# Patient Record
Sex: FEMALE | Race: WHITE | NOT HISPANIC OR LATINO | Employment: OTHER | URBAN - METROPOLITAN AREA
[De-identification: names, ages, dates, MRNs, and addresses within clinical notes are randomized per-mention and may not be internally consistent; named-entity substitution may affect disease eponyms.]

---

## 2021-01-14 ENCOUNTER — TRANSCRIBE ORDERS (OUTPATIENT)
Dept: ADMINISTRATIVE | Facility: HOSPITAL | Age: 81
End: 2021-01-14

## 2021-01-14 DIAGNOSIS — Z87.19 HISTORY OF CIRRHOSIS: Primary | ICD-10-CM

## 2021-01-25 ENCOUNTER — HOSPITAL ENCOUNTER (OUTPATIENT)
Dept: ULTRASOUND IMAGING | Facility: HOSPITAL | Age: 81
Discharge: HOME | End: 2021-01-25
Admitting: INTERNAL MEDICINE

## 2021-01-25 DIAGNOSIS — Z87.19 HISTORY OF CIRRHOSIS: ICD-10-CM

## 2021-01-25 PROCEDURE — 76700 US EXAM ABDOM COMPLETE: CPT

## 2021-01-29 ENCOUNTER — HOSPITAL ENCOUNTER (OUTPATIENT)
Dept: CARDIOLOGY | Facility: HOSPITAL | Age: 81
Discharge: HOME | End: 2021-01-29

## 2021-01-29 DIAGNOSIS — Z00.6 ENCOUNTER FOR EXAMINATION FOR NORMAL COMPARISON OR CONTROL IN CLINICAL RESEARCH PROGRAM: ICD-10-CM

## 2021-02-18 DIAGNOSIS — Z01.812 BLOOD TESTS PRIOR TO TREATMENT OR PROCEDURE: Primary | ICD-10-CM

## 2021-02-19 ENCOUNTER — LAB (OUTPATIENT)
Dept: PULMONOLOGY | Facility: CLINIC | Age: 81
End: 2021-02-19

## 2021-02-19 DIAGNOSIS — Z01.812 BLOOD TESTS PRIOR TO TREATMENT OR PROCEDURE: ICD-10-CM

## 2021-02-19 PROCEDURE — U0004 COV-19 TEST NON-CDC HGH THRU: HCPCS | Performed by: INTERNAL MEDICINE

## 2021-02-19 PROCEDURE — 99211 OFF/OP EST MAY X REQ PHY/QHP: CPT | Performed by: INTERNAL MEDICINE

## 2021-02-19 PROCEDURE — U0005 INFEC AGEN DETEC AMPLI PROBE: HCPCS | Performed by: INTERNAL MEDICINE

## 2021-02-20 LAB — SARS-COV-2 RNA RESP QL NAA+PROBE: NOT DETECTED

## 2021-02-22 ENCOUNTER — OFFICE VISIT (OUTPATIENT)
Dept: PULMONOLOGY | Facility: CLINIC | Age: 81
End: 2021-02-22

## 2021-02-22 VITALS
HEIGHT: 62 IN | WEIGHT: 166.5 LBS | DIASTOLIC BLOOD PRESSURE: 70 MMHG | HEART RATE: 86 BPM | RESPIRATION RATE: 18 BRPM | TEMPERATURE: 96.9 F | BODY MASS INDEX: 30.64 KG/M2 | SYSTOLIC BLOOD PRESSURE: 106 MMHG | OXYGEN SATURATION: 93 %

## 2021-02-22 DIAGNOSIS — J30.89 NON-SEASONAL ALLERGIC RHINITIS, UNSPECIFIED TRIGGER: ICD-10-CM

## 2021-02-22 DIAGNOSIS — J43.2 CENTRILOBULAR EMPHYSEMA (HCC): Primary | ICD-10-CM

## 2021-02-22 DIAGNOSIS — R91.1 LUNG NODULE: ICD-10-CM

## 2021-02-22 PROBLEM — E78.5 HYPERLIPIDEMIA LDL GOAL <100: Status: ACTIVE | Noted: 2021-02-22

## 2021-02-22 PROBLEM — G47.33 OBSTRUCTIVE SLEEP APNEA: Status: ACTIVE | Noted: 2021-02-22

## 2021-02-22 PROBLEM — I42.8 NONISCHEMIC CARDIOMYOPATHY (HCC): Status: ACTIVE | Noted: 2021-02-22

## 2021-02-22 PROBLEM — I42.0 ISCHEMIC DILATED CARDIOMYOPATHY (HCC): Status: ACTIVE | Noted: 2021-02-22

## 2021-02-22 PROBLEM — I10 ESSENTIAL HYPERTENSION: Status: ACTIVE | Noted: 2021-02-22

## 2021-02-22 PROBLEM — I25.5 ISCHEMIC DILATED CARDIOMYOPATHY (HCC): Status: ACTIVE | Noted: 2021-02-22

## 2021-02-22 PROCEDURE — 94726 PLETHYSMOGRAPHY LUNG VOLUMES: CPT | Performed by: INTERNAL MEDICINE

## 2021-02-22 PROCEDURE — 99204 OFFICE O/P NEW MOD 45 MIN: CPT | Performed by: INTERNAL MEDICINE

## 2021-02-22 PROCEDURE — 94060 EVALUATION OF WHEEZING: CPT | Performed by: INTERNAL MEDICINE

## 2021-02-22 PROCEDURE — 94729 DIFFUSING CAPACITY: CPT | Performed by: INTERNAL MEDICINE

## 2021-02-22 RX ORDER — CITALOPRAM 20 MG/1
20 TABLET ORAL DAILY
COMMUNITY
Start: 2021-01-18

## 2021-02-22 RX ORDER — CHOLECALCIFEROL (VITAMIN D3) 125 MCG
5 CAPSULE ORAL NIGHTLY PRN
COMMUNITY

## 2021-02-22 RX ORDER — ALBUTEROL SULFATE 90 UG/1
2 AEROSOL, METERED RESPIRATORY (INHALATION) EVERY 4 HOURS PRN
COMMUNITY

## 2021-02-22 RX ORDER — FUROSEMIDE 40 MG/1
40 TABLET ORAL DAILY
COMMUNITY
Start: 2021-01-16

## 2021-02-22 RX ORDER — ALBUTEROL SULFATE 90 UG/1
4 AEROSOL, METERED RESPIRATORY (INHALATION) ONCE
Status: COMPLETED | OUTPATIENT
Start: 2021-02-22 | End: 2021-02-22

## 2021-02-22 RX ORDER — GABAPENTIN 600 MG/1
TABLET ORAL
COMMUNITY
Start: 2020-11-25

## 2021-02-22 RX ORDER — CARVEDILOL 6.25 MG/1
6.25 TABLET ORAL 2 TIMES DAILY WITH MEALS
COMMUNITY
Start: 2021-02-16 | End: 2021-02-24

## 2021-02-22 RX ORDER — AMITRIPTYLINE HYDROCHLORIDE 25 MG/1
25 TABLET, FILM COATED ORAL NIGHTLY
COMMUNITY
Start: 2021-01-30

## 2021-02-22 RX ORDER — ACETAMINOPHEN 160 MG
2000 TABLET,DISINTEGRATING ORAL DAILY
COMMUNITY

## 2021-02-22 RX ORDER — DONEPEZIL HYDROCHLORIDE 10 MG/1
10 TABLET, FILM COATED ORAL
COMMUNITY
Start: 2020-12-14

## 2021-02-22 RX ORDER — OMEPRAZOLE 40 MG/1
40 CAPSULE, DELAYED RELEASE ORAL DAILY
COMMUNITY
Start: 2021-01-16

## 2021-02-22 RX ADMIN — ALBUTEROL SULFATE 4 PUFF: 90 AEROSOL, METERED RESPIRATORY (INHALATION) at 13:00
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
## 2021-02-24 ENCOUNTER — CONSULT (OUTPATIENT)
Dept: CARDIOLOGY | Facility: CLINIC | Age: 81
End: 2021-02-24

## 2021-02-24 VITALS
OXYGEN SATURATION: 95 % | HEIGHT: 62 IN | DIASTOLIC BLOOD PRESSURE: 62 MMHG | BODY MASS INDEX: 30.29 KG/M2 | SYSTOLIC BLOOD PRESSURE: 130 MMHG | WEIGHT: 164.6 LBS | HEART RATE: 95 BPM

## 2021-02-24 DIAGNOSIS — I10 ESSENTIAL HYPERTENSION: ICD-10-CM

## 2021-02-24 DIAGNOSIS — I42.8 NONISCHEMIC CARDIOMYOPATHY (HCC): Primary | ICD-10-CM

## 2021-02-24 DIAGNOSIS — E78.5 HYPERLIPIDEMIA LDL GOAL <100: ICD-10-CM

## 2021-02-24 DIAGNOSIS — I49.3 PVC (PREMATURE VENTRICULAR CONTRACTION): ICD-10-CM

## 2021-02-24 PROCEDURE — 93284 PRGRMG EVAL IMPLANTABLE DFB: CPT | Performed by: INTERNAL MEDICINE

## 2021-02-24 PROCEDURE — 99204 OFFICE O/P NEW MOD 45 MIN: CPT | Performed by: INTERNAL MEDICINE

## 2021-02-24 RX ORDER — CARVEDILOL 12.5 MG/1
12.5 TABLET ORAL 2 TIMES DAILY
Qty: 180 TABLET | Refills: 3 | Status: SHIPPED | OUTPATIENT
Start: 2021-02-24

## 2021-02-24 RX ORDER — EZETIMIBE 10 MG/1
10 TABLET ORAL DAILY
Qty: 30 TABLET | Refills: 11 | Status: SHIPPED | OUTPATIENT
Start: 2021-02-24
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
## 2021-03-11 ENCOUNTER — TRANSCRIBE ORDERS (OUTPATIENT)
Dept: ADMINISTRATIVE | Facility: HOSPITAL | Age: 81
End: 2021-03-11

## 2021-03-11 ENCOUNTER — HOSPITAL ENCOUNTER (OUTPATIENT)
Dept: GENERAL RADIOLOGY | Facility: HOSPITAL | Age: 81
Discharge: HOME | End: 2021-03-11
Admitting: INTERNAL MEDICINE

## 2021-03-11 DIAGNOSIS — R60.9 EDEMA, UNSPECIFIED TYPE: ICD-10-CM

## 2021-03-11 DIAGNOSIS — R60.9 EDEMA, UNSPECIFIED TYPE: Primary | ICD-10-CM

## 2021-03-11 PROCEDURE — 71046 X-RAY EXAM CHEST 2 VIEWS: CPT

## 2021-04-07 ENCOUNTER — DOCUMENTATION (OUTPATIENT)
Dept: PULMONOLOGY | Facility: CLINIC | Age: 81
End: 2021-04-07

## 2022-06-10 ENCOUNTER — TELEPHONE (OUTPATIENT)
Dept: HEMATOLOGY/ONCOLOGY | Facility: CLINIC | Age: 82
End: 2022-06-10
Payer: MEDICARE

## 2022-06-13 ENCOUNTER — TELEPHONE (OUTPATIENT)
Dept: HEMATOLOGY/ONCOLOGY | Facility: CLINIC | Age: 82
End: 2022-06-13

## 2022-06-13 ENCOUNTER — OFFICE VISIT (OUTPATIENT)
Dept: HEMATOLOGY/ONCOLOGY | Facility: CLINIC | Age: 82
End: 2022-06-13
Payer: MEDICARE

## 2022-06-13 VITALS
HEIGHT: 62 IN | DIASTOLIC BLOOD PRESSURE: 75 MMHG | SYSTOLIC BLOOD PRESSURE: 129 MMHG | RESPIRATION RATE: 16 BRPM | BODY MASS INDEX: 27.23 KG/M2 | HEART RATE: 75 BPM | OXYGEN SATURATION: 92 % | WEIGHT: 148 LBS

## 2022-06-13 DIAGNOSIS — Z79.620 LONG-TERM CURRENT USE OF RITUXIMAB: ICD-10-CM

## 2022-06-13 DIAGNOSIS — R94.5 ABNORMAL RESULTS OF LIVER FUNCTION STUDIES: ICD-10-CM

## 2022-06-13 DIAGNOSIS — C88.4 EXTRANODAL MARGINAL ZONE B-CELL LYMPHOMA: Primary | ICD-10-CM

## 2022-06-13 PROBLEM — C88.40 EXTRANODAL MARGINAL ZONE B-CELL LYMPHOMA: Status: ACTIVE | Noted: 2022-06-13

## 2022-06-13 LAB
ALBUMIN SERPL BCP-MCNC: 3.4 G/DL (ref 3.4–5)
ALBUMIN/GLOBULIN RATIO: 0.76 RATIO (ref 1.1–1.8)
ALP SERPL-CCNC: 70 U/L (ref 46–116)
ALT SERPL W P-5'-P-CCNC: 38 U/L (ref 12–78)
ANION GAP SERPL CALC-SCNC: 5 MMOL/L (ref 3–11)
AST SERPL-CCNC: 35 U/L (ref 15–37)
BASOPHILS NFR BLD: 0.8 % (ref 0–3)
BILIRUB SERPL-MCNC: 0.3 MG/DL (ref 0–1)
BUN SERPL-MCNC: 31 MG/DL (ref 7–18)
BUN/CREAT SERPL: 23.13 RATIO (ref 7–18)
CALCIUM SERPL-MCNC: 9.2 MG/DL (ref 8.8–10.5)
CHLORIDE SERPL-SCNC: 106 MMOL/L (ref 100–108)
CO2 SERPL-SCNC: 30 MMOL/L (ref 21–32)
CREAT SERPL-MCNC: 1.34 MG/DL (ref 0.55–1.02)
EOSINOPHIL NFR BLD: 4.4 % (ref 1–3)
ERYTHROCYTE [DISTWIDTH] IN BLOOD BY AUTOMATED COUNT: 14.6 % (ref 12.5–18)
GFR ESTIMATION: 38
GLOBULIN: 4.5 G/DL (ref 2.3–3.5)
GLUCOSE SERPL-MCNC: 88 MG/DL (ref 70–110)
HAV IGM SERPL QL IA: NONREACTIVE
HBV CORE IGM SERPL QL IA: NONREACTIVE
HBV SURFACE AB SER-ACNC: NONREACTIVE M[IU]/ML
HBV SURFACE AG SERPL QL IA: NONREACTIVE
HCT VFR BLD AUTO: 36.7 % (ref 37–47)
HCV AB SERPL QL IA: NONREACTIVE
HGB BLD-MCNC: 11.9 G/DL (ref 12–16)
LDH SERPL L TO P-CCNC: 197 U/L (ref 82–234)
LYMPHOCYTES NFR BLD: 31 % (ref 25–40)
MACROCYTES BLD QL SMEAR: NORMAL
MCH RBC QN AUTO: 33.1 PG (ref 27–31.2)
MCHC RBC AUTO-ENTMCNC: 32.4 G/DL (ref 31.8–35.4)
MCV RBC AUTO: 101.9 FL (ref 80–97)
MONOCYTES NFR BLD: 8.3 % (ref 1–15)
NEUTROPHILS # BLD AUTO: 4.97 10*3/UL (ref 1.8–7.7)
NEUTROPHILS NFR BLD: 54.8 % (ref 37–80)
NUCLEATED RED BLOOD CELLS: 0 %
PLATELETS: 131 10*3/UL (ref 142–424)
POTASSIUM SERPL-SCNC: 3.6 MMOL/L (ref 3.6–5.2)
PROT SERPL-MCNC: 7.9 G/DL (ref 6.4–8.2)
RBC # BLD AUTO: 3.6 10*6/UL (ref 4.2–5.4)
SODIUM BLD-SCNC: 141 MMOL/L (ref 135–145)
WBC # BLD: 9.1 10*3/UL (ref 4.6–10.2)

## 2022-06-13 PROCEDURE — 99205 PR OFFICE/OUTPT VISIT, NEW, LEVL V, 60-74 MIN: ICD-10-PCS | Mod: S$GLB,,, | Performed by: INTERNAL MEDICINE

## 2022-06-13 PROCEDURE — 99205 OFFICE O/P NEW HI 60 MIN: CPT | Mod: S$GLB,,, | Performed by: INTERNAL MEDICINE

## 2022-06-13 RX ORDER — FUROSEMIDE 20 MG/1
TABLET ORAL
COMMUNITY
Start: 2022-04-11

## 2022-06-13 RX ORDER — PREDNISONE 10 MG/1
TABLET ORAL
COMMUNITY
Start: 2022-06-09

## 2022-06-13 RX ORDER — CHOLECALCIFEROL (VITAMIN D3) 25 MCG
1000 TABLET ORAL DAILY
COMMUNITY

## 2022-06-13 RX ORDER — IPRATROPIUM BROMIDE 42 UG/1
2 SPRAY, METERED NASAL 4 TIMES DAILY
COMMUNITY

## 2022-06-13 RX ORDER — ALLOPURINOL 100 MG/1
TABLET ORAL
COMMUNITY
Start: 2022-05-21

## 2022-06-13 RX ORDER — CITALOPRAM 20 MG/1
TABLET, FILM COATED ORAL
COMMUNITY
Start: 2022-01-25

## 2022-06-13 RX ORDER — METOPROLOL SUCCINATE 50 MG/1
TABLET, EXTENDED RELEASE ORAL
COMMUNITY
Start: 2022-03-25

## 2022-06-13 RX ORDER — MULTIVIT WITH MINERALS/HERBS
1 TABLET ORAL DAILY
COMMUNITY

## 2022-06-13 RX ORDER — ROSUVASTATIN CALCIUM 5 MG/1
TABLET, COATED ORAL
COMMUNITY
Start: 2022-03-25

## 2022-06-13 RX ORDER — LEVOFLOXACIN 500 MG/1
TABLET, FILM COATED ORAL
COMMUNITY
Start: 2022-06-09

## 2022-06-13 RX ORDER — ACETAMINOPHEN 500 MG
TABLET ORAL NIGHTLY
COMMUNITY

## 2022-06-13 RX ORDER — GABAPENTIN 600 MG/1
TABLET ORAL
COMMUNITY
Start: 2022-05-23

## 2022-06-13 RX ORDER — FUROSEMIDE 40 MG/1
TABLET ORAL
COMMUNITY
Start: 2022-05-23

## 2022-06-13 RX ORDER — FLUTICASONE FUROATE, UMECLIDINIUM BROMIDE AND VILANTEROL TRIFENATATE 200; 62.5; 25 UG/1; UG/1; UG/1
1 POWDER RESPIRATORY (INHALATION) DAILY
COMMUNITY

## 2022-06-13 RX ORDER — MIRTAZAPINE 15 MG/1
TABLET, FILM COATED ORAL
COMMUNITY
Start: 2022-05-04

## 2022-06-13 RX ORDER — DONEPEZIL HYDROCHLORIDE 10 MG/1
TABLET, FILM COATED ORAL
COMMUNITY
Start: 2022-03-10

## 2022-06-13 NOTE — LETTER
June 13, 2022        Oscar Jamison MD  [facility]  Rigo SMITH [id number]             [facility]  [address]  [place] [address]  Phone: [phone]  Fax: [phone]   Patient: DEMOND Solorzano   MR Number: [id number]   YOB: 1940   Date of Visit: 6/13/2022       Dear Dr. Jamison:    Thank you for referring A Jeanine to me for evaluation. Attached you will find relevant portions of my assessment and plan of care.    If you have questions, please do not hesitate to call me. I look forward to following A Jeanine along with you.    Sincerely,      Leighton Ring MD            CC  No Recipients    Enclosure

## 2022-06-13 NOTE — PROGRESS NOTES
MEDICAL ONCOLOGY INITIAL CONSULTATION NOTE    Patient ID: DEMOND Solorzano is a 82 y.o. female.    Chief Complaint: B cell Lymphoma    HPI:  Patient is 82-year-old female with past medical history of COPD, emphysema, bronchitis, depression, gastroesophageal reflux disease, hypertension, congestive heart failure with ejection fraction normalized after biventricular pacing, atelectasis and scarring, degenerative joint disease, hyperlipidemia, sleep apnea, dilated cardiomyopathy, ventricular tachycardia status post ICD, coronary artery disease, chronic back pain, radiculopathy who was referred by Pulmonary after imaging revealed a 1.9 cm left lower lobe nodular density by CT/PET in March of 2019 with SUV 2. Patient underwent repeat PET-CT and left lower lobe CT-guided needle biopsy secondary to enlarging left lower lobe lesion.  Findings were consistent with B-cell lymphoma.  Please see detailed pathology report below.  Patient presents to our clinic today for further evaluation.      Pathology: 06/08/22    Lung nodule, left lower lobe, biopsy:  Extranodal marginal zone B-cell lymphoma (see comment).    Comment:  Sections of the small biopsy show a population of small to medium, round atypical lymphocytes with coarse chromatin and occasional nucleoli, demonstrating moderate cytoplasm and monocyte toyed morphology.  Immunohistochemical stains are performed on block 1A with appropriate controls (in addition to being repeated with flow cytometry) in order to further evaluate the atypical lymphocytes.  The cells of interest demonstrated positive staining for CD 20, [place] 5 and BCL 2 while showing negative straining for CD3 and CD5 (highlights T-cells), BCL6 and CD10 (highlights rare diminutive germinal centers) and cyclin D1.  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    Interpretation:  Taken together these results  demonstrate the presence of clonal population of B lymphocytes.  The immunophenotype in this case in our corrected stick of chronic lymphocytic leukemia/small lymphocytic lymphoma or mantle cell lymphoma.        Imaging:     PET/CT scan: 6/1/22    Chest:   1. There is an area of ground-glass infiltration seen in the left posterior lung.  This measures maximum of approximately 14 mm today versus approximately 8 mm on the previous examination.  The SUV in this area is approximately 1.5.  Given the growth of this lesion this would be suspicious for a possible low-grade malignancy.  2. An ill-defined area of ground-glass infiltrate is seen in the right mid lung field measuring approximately 1.3 cm.  This was not present on the previous examination.  This has an SUV of 1.7  3. A small area of ground-glass infiltrate is seen in the right lower lobe region measuring about 1 cm in diameter this has an SUV in the area of approximately 1.1  4. An area of spiculated parenchymal densities seen in the left lower lobe having a maximum diameter of approximately 2 cm.  This had a diameter of about 1.9 cm on previous examination.  The SUV in this is approximately 3.    Impression:  Multiple areas of parenchymal density in the lungs.  These are either slightly enlarged are new when compared to previous examinations.  Uptake values would be consistent with inflammatory, infectious or low-grade malignancy.      Past Medical History:   Diagnosis Date    Arthritis     COPD (chronic obstructive pulmonary disease)     Hypertension      Family History   Problem Relation Age of Onset    Lung cancer Father     Brain cancer Brother     Breast cancer Paternal Grandmother     Cancer Son      Social History     Socioeconomic History    Marital status:    Tobacco Use    Smoking status: Former Smoker     Years: 15.00    Smokeless tobacco: Never Used   Substance and Sexual Activity    Alcohol use: Not Currently     Past Surgical  History:   Procedure Laterality Date    arm surgery      BACK SURGERY      CHOLECYSTECTOMY      TUBAL LIGATION           Review of systems:  Review of Systems   Constitutional: Positive for activity change. Negative for appetite change, chills, diaphoresis, fatigue and unexpected weight change.   HENT: Negative for congestion, facial swelling, hearing loss, mouth sores, trouble swallowing and voice change.    Eyes: Negative for photophobia, pain, discharge and itching.   Respiratory: Positive for cough and shortness of breath. Negative for apnea, choking and chest tightness.    Cardiovascular: Negative for chest pain, palpitations and leg swelling.   Gastrointestinal: Negative for abdominal distention, abdominal pain, anal bleeding and blood in stool.   Endocrine: Negative for cold intolerance, heat intolerance, polydipsia and polyphagia.   Genitourinary: Negative for difficulty urinating, dysuria, flank pain and hematuria.   Musculoskeletal: Negative for arthralgias, back pain, joint swelling, myalgias, neck pain and neck stiffness.   Skin: Negative for color change, pallor and wound.   Allergic/Immunologic: Positive for environmental allergies. Negative for food allergies and immunocompromised state.   Neurological: Negative for dizziness, seizures, facial asymmetry, speech difficulty, light-headedness, numbness and headaches.   Hematological: Negative for adenopathy. Does not bruise/bleed easily.   Psychiatric/Behavioral: Negative for agitation, behavioral problems, confusion, decreased concentration and sleep disturbance.         Physical Exam  Vitals and nursing note reviewed.   Constitutional:       General: She is not in acute distress.     Appearance: Normal appearance. She is not ill-appearing.   HENT:      Head: Normocephalic and atraumatic.      Nose: No congestion or rhinorrhea.   Eyes:      General: No scleral icterus.     Extraocular Movements: Extraocular movements intact.      Pupils: Pupils are  equal, round, and reactive to light.   Cardiovascular:      Rate and Rhythm: Normal rate and regular rhythm.      Pulses: Normal pulses.      Heart sounds: Normal heart sounds. No murmur heard.    No gallop.   Pulmonary:      Effort: Pulmonary effort is normal. No respiratory distress.      Breath sounds: Normal breath sounds. No stridor. No wheezing or rhonchi.   Abdominal:      General: Bowel sounds are normal. There is no distension.      Palpations: There is no mass.      Tenderness: There is no abdominal tenderness. There is no guarding.   Musculoskeletal:         General: No swelling, tenderness, deformity or signs of injury. Normal range of motion.      Cervical back: Normal range of motion and neck supple. No rigidity. No muscular tenderness.      Right lower leg: No edema.      Left lower leg: No edema.   Skin:     General: Skin is warm.      Coloration: Skin is not jaundiced or pale.      Findings: No bruising or lesion.   Neurological:      General: No focal deficit present.      Mental Status: She is alert and oriented to person, place, and time.      Cranial Nerves: No cranial nerve deficit.      Sensory: No sensory deficit.      Motor: No weakness.      Gait: Gait normal.   Psychiatric:         Mood and Affect: Mood normal.         Behavior: Behavior normal.         Thought Content: Thought content normal.       Vitals:    06/13/22 1056   BP: 129/75   Pulse: 75   Resp: 16   Body surface area is 1.71 meters squared.    Assessment/Plan:      ECOG 2    1. 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     == PET scan does not show any findings beyond the diaphragm and I believe with a lesions noted on both sides of the lung we are likely looking at early stage extranodal marginal zone B-cell lymphoma arising from the lungs.   == ISRT is preferred option per  NCCN guidelines.  Either ways, looking at her FEV1 and age do not feel she is a surgical candidate but will discuss in tumor board.  Will hence place referral to Radiation Oncology.  If not a candidate for radiation, then rituximab versus observation can be considered.   I discussed with her that I feel she should be able to tolerate rituximab in this case as it is not traditional chemotherapy and is more like a targeted approach to CD 20 positive lymphoma cells         Plan:  Referral to Radiation Oncology   Tumor Board discussion  Relevant Labs as above       Total time spent in counseling and discussion about further management options including relevant lab work, treatment,  prognosis, medications and intended side effects was more than 60 minutes. More than 50% of the time was spent on counseling and coordination of care.  I spent a total of 60 minutes on the day of the visit.This includes face to face time and non-face to face time preparing to see the patient (eg, review of tests), Obtaining and/or reviewing separately obtained history, Documenting clinical information in the electronic or other health record, Independently interpreting resultsand communicating results to the patient/family/caregiver, or Care coordination.

## 2022-06-20 ENCOUNTER — OFFICE VISIT (OUTPATIENT)
Dept: HEMATOLOGY/ONCOLOGY | Facility: CLINIC | Age: 82
End: 2022-06-20
Payer: MEDICARE

## 2022-06-20 VITALS
SYSTOLIC BLOOD PRESSURE: 147 MMHG | DIASTOLIC BLOOD PRESSURE: 55 MMHG | BODY MASS INDEX: 27.23 KG/M2 | OXYGEN SATURATION: 89 % | RESPIRATION RATE: 18 BRPM | HEIGHT: 62 IN | WEIGHT: 148 LBS | HEART RATE: 53 BPM

## 2022-06-20 DIAGNOSIS — C88.4 EXTRANODAL MARGINAL ZONE B-CELL LYMPHOMA: Primary | ICD-10-CM

## 2022-06-20 PROCEDURE — 99215 OFFICE O/P EST HI 40 MIN: CPT | Mod: S$GLB,,, | Performed by: INTERNAL MEDICINE

## 2022-06-20 PROCEDURE — 99215 PR OFFICE/OUTPT VISIT, EST, LEVL V, 40-54 MIN: ICD-10-PCS | Mod: S$GLB,,, | Performed by: INTERNAL MEDICINE

## 2022-06-20 NOTE — PROGRESS NOTES
MEDICAL ONCOLOGY FOLLOW UP CONSULTATION NOTE    Patient ID: DEMOND Solorzano is a 82 y.o. female.    Chief Complaint: B cell Lymphoma    HPI:  Patient is 82-year-old female with past medical history of COPD, emphysema, bronchitis, depression, gastroesophageal reflux disease, hypertension, congestive heart failure with ejection fraction normalized after biventricular pacing, atelectasis and scarring, degenerative joint disease, hyperlipidemia, sleep apnea, dilated cardiomyopathy, ventricular tachycardia status post ICD, coronary artery disease, chronic back pain, radiculopathy who was referred by Pulmonary after imaging revealed a 1.9 cm left lower lobe nodular density by CT/PET in March of 2019 with SUV 2. Patient underwent repeat PET-CT and left lower lobe CT-guided needle biopsy secondary to enlarging left lower lobe lesion.  Findings were consistent with B-cell lymphoma.  Please see detailed pathology report below.  Patient presents to our clinic today for further evaluation.      Pathology: 06/08/22    Lung nodule, left lower lobe, biopsy:  Extranodal marginal zone B-cell lymphoma (see comment).    Comment:  Sections of the small biopsy show a population of small to medium, round atypical lymphocytes with coarse chromatin and occasional nucleoli, demonstrating moderate cytoplasm and monocyte toyed morphology.  Immunohistochemical stains are performed on block 1A with appropriate controls (in addition to being repeated with flow cytometry) in order to further evaluate the atypical lymphocytes.  The cells of interest demonstrated positive staining for CD 20, [place] 5 and BCL 2 while showing negative straining for CD3 and CD5 (highlights T-cells), BCL6 and CD10 (highlights rare diminutive germinal centers) and cyclin D1.  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    Interpretation:  Taken together these  results demonstrate the presence of clonal population of B lymphocytes.  The immunophenotype in this case in our corrected stick of chronic lymphocytic leukemia/small lymphocytic lymphoma or mantle cell lymphoma.        Imaging:     PET/CT scan: 6/1/22    Chest:   1. There is an area of ground-glass infiltration seen in the left posterior lung.  This measures maximum of approximately 14 mm today versus approximately 8 mm on the previous examination.  The SUV in this area is approximately 1.5.  Given the growth of this lesion this would be suspicious for a possible low-grade malignancy.  2. An ill-defined area of ground-glass infiltrate is seen in the right mid lung field measuring approximately 1.3 cm.  This was not present on the previous examination.  This has an SUV of 1.7  3. A small area of ground-glass infiltrate is seen in the right lower lobe region measuring about 1 cm in diameter this has an SUV in the area of approximately 1.1  4. An area of spiculated parenchymal densities seen in the left lower lobe having a maximum diameter of approximately 2 cm.  This had a diameter of about 1.9 cm on previous examination.  The SUV in this is approximately 3.    Impression:  Multiple areas of parenchymal density in the lungs.  These are either slightly enlarged are new when compared to previous examinations.  Uptake values would be consistent with inflammatory, infectious or low-grade malignancy.      Past Medical History:   Diagnosis Date    Arthritis     COPD (chronic obstructive pulmonary disease)     Hypertension      Family History   Problem Relation Age of Onset    Lung cancer Father     Brain cancer Brother     Breast cancer Paternal Grandmother     Cancer Son      Social History     Socioeconomic History    Marital status:    Tobacco Use    Smoking status: Former Smoker     Years: 15.00    Smokeless tobacco: Never Used   Substance and Sexual Activity    Alcohol use: Not Currently     Past  Surgical History:   Procedure Laterality Date    arm surgery      BACK SURGERY      CHOLECYSTECTOMY      TUBAL LIGATION           Review of systems:  Review of Systems   Constitutional: Positive for activity change. Negative for appetite change, chills, diaphoresis, fatigue and unexpected weight change.   HENT: Negative for congestion, facial swelling, hearing loss, mouth sores, trouble swallowing and voice change.    Eyes: Negative for photophobia, pain, discharge and itching.   Respiratory: Positive for cough and shortness of breath. Negative for apnea, choking and chest tightness.    Cardiovascular: Negative for chest pain, palpitations and leg swelling.   Gastrointestinal: Negative for abdominal distention, abdominal pain, anal bleeding and blood in stool.   Endocrine: Negative for cold intolerance, heat intolerance, polydipsia and polyphagia.   Genitourinary: Negative for difficulty urinating, dysuria, flank pain and hematuria.   Musculoskeletal: Negative for arthralgias, back pain, joint swelling, myalgias, neck pain and neck stiffness.   Skin: Negative for color change, pallor and wound.   Allergic/Immunologic: Positive for environmental allergies. Negative for food allergies and immunocompromised state.   Neurological: Negative for dizziness, seizures, facial asymmetry, speech difficulty, light-headedness, numbness and headaches.   Hematological: Negative for adenopathy. Does not bruise/bleed easily.   Psychiatric/Behavioral: Negative for agitation, behavioral problems, confusion, decreased concentration and sleep disturbance.         Physical Exam  Vitals and nursing note reviewed.   Constitutional:       General: She is not in acute distress.     Appearance: Normal appearance. She is not ill-appearing.   HENT:      Head: Normocephalic and atraumatic.      Nose: No congestion or rhinorrhea.   Eyes:      General: No scleral icterus.     Extraocular Movements: Extraocular movements intact.      Pupils:  Pupils are equal, round, and reactive to light.   Cardiovascular:      Rate and Rhythm: Normal rate and regular rhythm.      Pulses: Normal pulses.      Heart sounds: Normal heart sounds. No murmur heard.    No gallop.   Pulmonary:      Effort: Pulmonary effort is normal. No respiratory distress.      Breath sounds: Normal breath sounds. No stridor. No wheezing or rhonchi.   Abdominal:      General: Bowel sounds are normal. There is no distension.      Palpations: There is no mass.      Tenderness: There is no abdominal tenderness. There is no guarding.   Musculoskeletal:         General: No swelling, tenderness, deformity or signs of injury. Normal range of motion.      Cervical back: Normal range of motion and neck supple. No rigidity. No muscular tenderness.      Right lower leg: No edema.      Left lower leg: No edema.   Skin:     General: Skin is warm.      Coloration: Skin is not jaundiced or pale.      Findings: No bruising or lesion.   Neurological:      General: No focal deficit present.      Mental Status: She is alert and oriented to person, place, and time.      Cranial Nerves: No cranial nerve deficit.      Sensory: No sensory deficit.      Motor: No weakness.      Gait: Gait normal.   Psychiatric:         Mood and Affect: Mood normal.         Behavior: Behavior normal.         Thought Content: Thought content normal.       Vitals:    06/20/22 1325   BP: (!) 147/55   Pulse: (!) 53   Resp: 18   Body surface area is 1.71 meters squared.    Assessment/Plan:      ECOG 2    1. 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     == PET scan does not show any findings beyond the diaphragm and I believe with a lesions noted on both sides of the lung we are likely looking at extranodal marginal zone B-cell lymphoma arising from b/l  lungs.   == 6/20/22:  Hep  panel negative. TMB discussion in the interim and also discussion with Radiation Oncology, also taking into account her lung function.  Per Dr. Rne radiation oncology ISRT is not an option considering we are looking at multiple lesions on bilateral lungs.  Also due to her decreased lung function surgical resection is not a possibility.  Consents is made per TMB discussion was to start systemic treatment with Rituximab.  Will obtain port placement and prior authorization request for rituximab       Plan:  PA request for Rituximab 4 cycles.   Port placement  Start Rituximab once authorized       Total time spent in counseling and discussion about further management options including relevant lab work, treatment,  prognosis, medications and intended side effects was more than 60 minutes. More than 50% of the time was spent on counseling and coordination of care.  I spent a total of 60 minutes on the day of the visit.This includes face to face time and non-face to face time preparing to see the patient (eg, review of tests), Obtaining and/or reviewing separately obtained history, Documenting clinical information in the electronic or other health record, Independently interpreting resultsand communicating results to the patient/family/caregiver, or Care coordination.

## 2022-06-22 RX ORDER — ACETAMINOPHEN 325 MG/1
650 TABLET ORAL
Status: CANCELLED | OUTPATIENT
Start: 2022-06-30

## 2022-06-22 RX ORDER — FAMOTIDINE 10 MG/ML
20 INJECTION INTRAVENOUS
Status: CANCELLED | OUTPATIENT
Start: 2022-06-30

## 2022-06-22 RX ORDER — SODIUM CHLORIDE 0.9 % (FLUSH) 0.9 %
10 SYRINGE (ML) INJECTION
Status: CANCELLED | OUTPATIENT
Start: 2022-06-30

## 2022-06-22 RX ORDER — MEPERIDINE HYDROCHLORIDE 50 MG/ML
25 INJECTION INTRAMUSCULAR; INTRAVENOUS; SUBCUTANEOUS
Status: CANCELLED | OUTPATIENT
Start: 2022-06-30

## 2022-06-22 RX ORDER — HEPARIN 100 UNIT/ML
500 SYRINGE INTRAVENOUS
Status: CANCELLED | OUTPATIENT
Start: 2022-06-30

## 2022-06-23 ENCOUNTER — OUTSIDE PLACE OF SERVICE (OUTPATIENT)
Dept: HEMATOLOGY/ONCOLOGY | Facility: CLINIC | Age: 82
End: 2022-06-23
Payer: MEDICARE

## 2022-06-23 PROCEDURE — 99233 PR SUBSEQUENT HOSPITAL CARE,LEVL III: ICD-10-PCS | Mod: ,,, | Performed by: NURSE PRACTITIONER

## 2022-06-23 PROCEDURE — 99233 SBSQ HOSP IP/OBS HIGH 50: CPT | Mod: ,,, | Performed by: NURSE PRACTITIONER

## 2022-06-25 ENCOUNTER — OUTSIDE PLACE OF SERVICE (OUTPATIENT)
Dept: PULMONOLOGY | Facility: CLINIC | Age: 82
End: 2022-06-25
Payer: MEDICARE

## 2022-06-25 PROCEDURE — 99233 SBSQ HOSP IP/OBS HIGH 50: CPT | Mod: FS,,, | Performed by: INTERNAL MEDICINE

## 2022-06-25 PROCEDURE — 99233 PR SUBSEQUENT HOSPITAL CARE,LEVL III: ICD-10-PCS | Mod: FS,,, | Performed by: INTERNAL MEDICINE

## 2022-06-27 ENCOUNTER — OFFICE VISIT (OUTPATIENT)
Dept: HEMATOLOGY/ONCOLOGY | Facility: CLINIC | Age: 82
End: 2022-06-27
Payer: MEDICARE

## 2022-06-27 VITALS
SYSTOLIC BLOOD PRESSURE: 157 MMHG | WEIGHT: 151.19 LBS | HEIGHT: 62 IN | BODY MASS INDEX: 27.82 KG/M2 | TEMPERATURE: 98 F | HEART RATE: 47 BPM | RESPIRATION RATE: 18 BRPM | DIASTOLIC BLOOD PRESSURE: 70 MMHG | OXYGEN SATURATION: 95 %

## 2022-06-27 DIAGNOSIS — Z71.9 ENCOUNTER FOR EDUCATION: ICD-10-CM

## 2022-06-27 DIAGNOSIS — C88.4 EXTRANODAL MARGINAL ZONE B-CELL LYMPHOMA: Primary | ICD-10-CM

## 2022-06-27 DIAGNOSIS — D63.0 ANEMIA IN NEOPLASTIC DISEASE: ICD-10-CM

## 2022-06-27 PROCEDURE — 99215 OFFICE O/P EST HI 40 MIN: CPT | Mod: S$GLB,,, | Performed by: NURSE PRACTITIONER

## 2022-06-27 PROCEDURE — 99215 PR OFFICE/OUTPT VISIT, EST, LEVL V, 40-54 MIN: ICD-10-PCS | Mod: S$GLB,,, | Performed by: NURSE PRACTITIONER

## 2022-06-27 RX ORDER — LIDOCAINE AND PRILOCAINE 25; 25 MG/G; MG/G
CREAM TOPICAL
Qty: 30 G | Refills: 0 | Status: SHIPPED | OUTPATIENT
Start: 2022-06-27

## 2022-06-27 NOTE — PROGRESS NOTES
MEDICAL ONCOLOGY FOLLOW UP CONSULTATION NOTE    Patient ID: DEMOND Solorzano is a 82 y.o. female.    Chief Complaint: B cell Lymphoma    HPI:  Patient is 82-year-old female with past medical history of COPD, emphysema, bronchitis, depression, gastroesophageal reflux disease, hypertension, congestive heart failure with ejection fraction normalized after biventricular pacing, atelectasis and scarring, degenerative joint disease, hyperlipidemia, sleep apnea, dilated cardiomyopathy, ventricular tachycardia status post ICD, coronary artery disease, chronic back pain, radiculopathy who was referred by Pulmonary after imaging revealed a 1.9 cm left lower lobe nodular density by CT/PET in March of 2019 with SUV 2. Patient underwent repeat PET-CT and left lower lobe CT-guided needle biopsy secondary to enlarging left lower lobe lesion.  Findings were consistent with B-cell lymphoma.  Please see detailed pathology report below.  Patient presents to our clinic today for further evaluation.      Pathology: 06/08/22    Lung nodule, left lower lobe, biopsy:  Extranodal marginal zone B-cell lymphoma (see comment).    Comment:  Sections of the small biopsy show a population of small to medium, round atypical lymphocytes with coarse chromatin and occasional nucleoli, demonstrating moderate cytoplasm and monocyte toyed morphology.  Immunohistochemical stains are performed on block 1A with appropriate controls (in addition to being repeated with flow cytometry) in order to further evaluate the atypical lymphocytes.  The cells of interest demonstrated positive staining for CD 20, [place] 5 and BCL 2 while showing negative straining for CD3 and CD5 (highlights T-cells), BCL6 and CD10 (highlights rare diminutive germinal centers) and cyclin D1.  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    Interpretation:  Taken together these  results demonstrate the presence of clonal population of B lymphocytes.  The immunophenotype in this case in our corrected stick of chronic lymphocytic leukemia/small lymphocytic lymphoma or mantle cell lymphoma.        Imaging:     PET/CT scan: 6/1/22    Chest:   1. There is an area of ground-glass infiltration seen in the left posterior lung.  This measures maximum of approximately 14 mm today versus approximately 8 mm on the previous examination.  The SUV in this area is approximately 1.5.  Given the growth of this lesion this would be suspicious for a possible low-grade malignancy.  2. An ill-defined area of ground-glass infiltrate is seen in the right mid lung field measuring approximately 1.3 cm.  This was not present on the previous examination.  This has an SUV of 1.7  3. A small area of ground-glass infiltrate is seen in the right lower lobe region measuring about 1 cm in diameter this has an SUV in the area of approximately 1.1  4. An area of spiculated parenchymal densities seen in the left lower lobe having a maximum diameter of approximately 2 cm.  This had a diameter of about 1.9 cm on previous examination.  The SUV in this is approximately 3.    Impression:  Multiple areas of parenchymal density in the lungs.  These are either slightly enlarged are new when compared to previous examinations.  Uptake values would be consistent with inflammatory, infectious or low-grade malignancy.      Past Medical History:   Diagnosis Date    Arthritis     COPD (chronic obstructive pulmonary disease)     Hypertension      Family History   Problem Relation Age of Onset    Lung cancer Father     Brain cancer Brother     Breast cancer Paternal Grandmother     Cancer Son      Social History     Socioeconomic History    Marital status:    Tobacco Use    Smoking status: Former Smoker     Years: 15.00    Smokeless tobacco: Never Used   Substance and Sexual Activity    Alcohol use: Not Currently     Past  Surgical History:   Procedure Laterality Date    arm surgery      BACK SURGERY      CHOLECYSTECTOMY      TUBAL LIGATION           Review of systems:  Review of Systems   Constitutional: Positive for activity change. Negative for appetite change, chills, diaphoresis, fatigue and unexpected weight change.   HENT: Negative for congestion, facial swelling, hearing loss, mouth sores, trouble swallowing and voice change.    Eyes: Negative for photophobia, pain, discharge and itching.   Respiratory: Positive for cough and shortness of breath. Negative for apnea, choking and chest tightness.    Cardiovascular: Negative for chest pain, palpitations and leg swelling.   Gastrointestinal: Negative for abdominal distention, abdominal pain, anal bleeding and blood in stool.   Endocrine: Negative for cold intolerance, heat intolerance, polydipsia and polyphagia.   Genitourinary: Negative for difficulty urinating, dysuria, flank pain and hematuria.   Musculoskeletal: Negative for arthralgias, back pain, joint swelling, myalgias, neck pain and neck stiffness.   Skin: Negative for color change, pallor and wound.   Allergic/Immunologic: Positive for environmental allergies. Negative for food allergies and immunocompromised state.   Neurological: Negative for dizziness, seizures, facial asymmetry, speech difficulty, light-headedness, numbness and headaches.   Hematological: Negative for adenopathy. Does not bruise/bleed easily.   Psychiatric/Behavioral: Negative for agitation, behavioral problems, confusion, decreased concentration and sleep disturbance.         Physical Exam  Vitals and nursing note reviewed.   Constitutional:       General: She is not in acute distress.     Appearance: Normal appearance. She is not ill-appearing.   HENT:      Head: Normocephalic and atraumatic.      Nose: No congestion or rhinorrhea.   Eyes:      General: No scleral icterus.     Extraocular Movements: Extraocular movements intact.      Pupils:  Pupils are equal, round, and reactive to light.   Cardiovascular:      Rate and Rhythm: Normal rate and regular rhythm.      Pulses: Normal pulses.      Heart sounds: Normal heart sounds. No murmur heard.    No gallop.   Pulmonary:      Effort: Pulmonary effort is normal. No respiratory distress.      Breath sounds: Normal breath sounds. No stridor. No wheezing or rhonchi.   Abdominal:      General: Bowel sounds are normal. There is no distension.      Palpations: There is no mass.      Tenderness: There is no abdominal tenderness. There is no guarding.   Musculoskeletal:         General: No swelling, tenderness, deformity or signs of injury. Normal range of motion.      Cervical back: Normal range of motion and neck supple. No rigidity. No muscular tenderness.      Right lower leg: No edema.      Left lower leg: No edema.   Skin:     General: Skin is warm.      Coloration: Skin is not jaundiced or pale.      Findings: No bruising or lesion.   Neurological:      General: No focal deficit present.      Mental Status: She is alert and oriented to person, place, and time.      Cranial Nerves: No cranial nerve deficit.      Sensory: No sensory deficit.      Motor: No weakness.      Gait: Gait normal.   Psychiatric:         Mood and Affect: Mood normal.         Behavior: Behavior normal.         Thought Content: Thought content normal.       Vitals:    06/27/22 1309   BP: (!) 157/70   Pulse: (!) 47   Resp: 18   Temp: 97.7 °F (36.5 °C)   Body surface area is 1.73 meters squared.    Assessment/Plan:      ECOG 2    1. 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     == PET scan does not show any findings beyond the diaphragm and I believe with a lesions noted on both sides of the lung we are likely looking at extranodal marginal zone B-cell lymphoma arising from b/l   lungs.   == 6/20/22:  Hep panel negative. TMB discussion in the interim and also discussion with Radiation Oncology, also taking into account her lung function.  Per Dr. Ren radiation oncology ISRT is not an option considering we are looking at multiple lesions on bilateral lungs.  Also due to her decreased lung function surgical resection is not a possibility.  Consents is made per TMB discussion was to start systemic treatment with Rituximab.  Will obtain port placement and prior authorization request for rituximab  == 6/27/22:  Patient and daughter here today to discuss upcoming chemotherapy, side effect profile, risk versus benefits, and expected outcomes have been discussed today. All questions and concerns answered and consents have been signed. Port placed while in patient, healing well. Plan is to begin treatment on Thursday, labs reviewed, Hepatitis panel negative. LDH WNL, mild anemia noted.     Plan:  Rituximab, weekly x 4 cycles.       Total time spent in counseling and discussion about further management options including relevant lab work, treatment,  prognosis, medications and intended side effects was more than 60 minutes. More than 50% of the time was spent on counseling and coordination of care.  I spent a total of 60 minutes on the day of the visit.This includes face to face time and non-face to face time preparing to see the patient (eg, review of tests), Obtaining and/or reviewing separately obtained history, Documenting clinical information in the electronic or other health record, Independently interpreting resultsand communicating results to the patient/family/caregiver, or Care coordination.

## 2022-06-30 DIAGNOSIS — C88.4 EXTRANODAL MARGINAL ZONE B-CELL LYMPHOMA: Primary | ICD-10-CM

## 2022-07-06 ENCOUNTER — OFFICE VISIT (OUTPATIENT)
Dept: HEMATOLOGY/ONCOLOGY | Facility: CLINIC | Age: 82
End: 2022-07-06
Payer: MEDICARE

## 2022-07-06 ENCOUNTER — CLINICAL SUPPORT (OUTPATIENT)
Dept: HEMATOLOGY/ONCOLOGY | Facility: CLINIC | Age: 82
End: 2022-07-06
Payer: MEDICARE

## 2022-07-06 VITALS
SYSTOLIC BLOOD PRESSURE: 133 MMHG | RESPIRATION RATE: 18 BRPM | BODY MASS INDEX: 27.17 KG/M2 | OXYGEN SATURATION: 93 % | DIASTOLIC BLOOD PRESSURE: 55 MMHG | HEIGHT: 62 IN | HEART RATE: 51 BPM | WEIGHT: 147.63 LBS

## 2022-07-06 DIAGNOSIS — C88.4 EXTRANODAL MARGINAL ZONE B-CELL LYMPHOMA: Primary | ICD-10-CM

## 2022-07-06 DIAGNOSIS — Z20.822 CLOSE EXPOSURE TO COVID-19 VIRUS: ICD-10-CM

## 2022-07-06 DIAGNOSIS — C88.4 EXTRANODAL MARGINAL ZONE B-CELL LYMPHOMA: ICD-10-CM

## 2022-07-06 LAB
ALBUMIN SERPL BCP-MCNC: 3.2 G/DL (ref 3.4–5)
ALBUMIN/GLOBULIN RATIO: 0.74 RATIO (ref 1.1–1.8)
ALP SERPL-CCNC: 65 U/L (ref 46–116)
ALT SERPL W P-5'-P-CCNC: 31 U/L (ref 12–78)
ANION GAP SERPL CALC-SCNC: 3 MMOL/L (ref 3–11)
AST SERPL-CCNC: 24 U/L (ref 15–37)
BASOPHILS NFR BLD: 1.2 % (ref 0–3)
BILIRUB SERPL-MCNC: 0.6 MG/DL (ref 0–1)
BUN SERPL-MCNC: 18 MG/DL (ref 7–18)
BUN/CREAT SERPL: 12.94 RATIO (ref 7–18)
CALCIUM SERPL-MCNC: 9.2 MG/DL (ref 8.8–10.5)
CHLORIDE SERPL-SCNC: 106 MMOL/L (ref 100–108)
CO2 SERPL-SCNC: 32 MMOL/L (ref 21–32)
CREAT SERPL-MCNC: 1.39 MG/DL (ref 0.55–1.02)
EOSINOPHIL NFR BLD: 3.7 % (ref 1–3)
ERYTHROCYTE [DISTWIDTH] IN BLOOD BY AUTOMATED COUNT: 15.4 % (ref 12.5–18)
GFR ESTIMATION: 36
GLOBULIN: 4.3 G/DL (ref 2.3–3.5)
GLUCOSE SERPL-MCNC: 144 MG/DL (ref 70–110)
HCT VFR BLD AUTO: 37.5 % (ref 37–47)
HGB BLD-MCNC: 12.3 G/DL (ref 12–16)
LDH SERPL L TO P-CCNC: 225 U/L (ref 82–234)
LYMPHOCYTES NFR BLD: 25.9 % (ref 25–40)
MACROCYTES BLD QL SMEAR: NORMAL
MCH RBC QN AUTO: 34.1 PG (ref 27–31.2)
MCHC RBC AUTO-ENTMCNC: 32.8 G/DL (ref 31.8–35.4)
MCV RBC AUTO: 103.9 FL (ref 80–97)
MONOCYTES NFR BLD: 9 % (ref 1–15)
NEUTROPHILS # BLD AUTO: 3.39 10*3/UL (ref 1.8–7.7)
NEUTROPHILS NFR BLD: 59 % (ref 37–80)
NUCLEATED RED BLOOD CELLS: 0 %
PLATELETS: 123 10*3/UL (ref 142–424)
POTASSIUM SERPL-SCNC: 4.6 MMOL/L (ref 3.6–5.2)
PROT SERPL-MCNC: 7.5 G/DL (ref 6.4–8.2)
RBC # BLD AUTO: 3.61 10*6/UL (ref 4.2–5.4)
SODIUM BLD-SCNC: 141 MMOL/L (ref 135–145)
WBC # BLD: 5.8 10*3/UL (ref 4.6–10.2)

## 2022-07-06 PROCEDURE — 99214 PR OFFICE/OUTPT VISIT, EST, LEVL IV, 30-39 MIN: ICD-10-PCS | Mod: S$GLB,,, | Performed by: NURSE PRACTITIONER

## 2022-07-06 PROCEDURE — 99214 OFFICE O/P EST MOD 30 MIN: CPT | Mod: S$GLB,,, | Performed by: NURSE PRACTITIONER

## 2022-07-06 RX ORDER — HEPARIN 100 UNIT/ML
500 SYRINGE INTRAVENOUS
Status: CANCELLED | OUTPATIENT
Start: 2022-07-13

## 2022-07-06 RX ORDER — DIPHENHYDRAMINE HCL 25 MG
50 CAPSULE ORAL
Status: CANCELLED | OUTPATIENT
Start: 2022-07-13

## 2022-07-06 RX ORDER — FAMOTIDINE 20 MG/1
20 TABLET, FILM COATED ORAL
Status: CANCELLED | OUTPATIENT
Start: 2022-07-13

## 2022-07-06 RX ORDER — MEPERIDINE HYDROCHLORIDE 50 MG/ML
25 INJECTION INTRAMUSCULAR; INTRAVENOUS; SUBCUTANEOUS
Status: CANCELLED | OUTPATIENT
Start: 2022-07-13

## 2022-07-06 RX ORDER — ACETAMINOPHEN 325 MG/1
650 TABLET ORAL
Status: CANCELLED | OUTPATIENT
Start: 2022-07-13

## 2022-07-06 RX ORDER — SODIUM CHLORIDE 0.9 % (FLUSH) 0.9 %
10 SYRINGE (ML) INJECTION
Status: CANCELLED | OUTPATIENT
Start: 2022-07-13

## 2022-07-06 NOTE — PROGRESS NOTES
MEDICAL ONCOLOGY FOLLOW UP CONSULTATION NOTE    Patient ID: DEMOND Solorzano is a 82 y.o. female.    Chief Complaint: B cell Lymphoma    HPI:  Patient is 82-year-old female with past medical history of COPD, emphysema, bronchitis, depression, gastroesophageal reflux disease, hypertension, congestive heart failure with ejection fraction normalized after biventricular pacing, atelectasis and scarring, degenerative joint disease, hyperlipidemia, sleep apnea, dilated cardiomyopathy, ventricular tachycardia status post ICD, coronary artery disease, chronic back pain, radiculopathy who was referred by Pulmonary after imaging revealed a 1.9 cm left lower lobe nodular density by CT/PET in March of 2019 with SUV 2. Patient underwent repeat PET-CT and left lower lobe CT-guided needle biopsy secondary to enlarging left lower lobe lesion.  Findings were consistent with B-cell lymphoma.  Please see detailed pathology report below.  Patient presents to our clinic today for further evaluation.      Pathology: 06/08/22    Lung nodule, left lower lobe, biopsy:  Extranodal marginal zone B-cell lymphoma (see comment).    Comment:  Sections of the small biopsy show a population of small to medium, round atypical lymphocytes with coarse chromatin and occasional nucleoli, demonstrating moderate cytoplasm and monocyte toyed morphology.  Immunohistochemical stains are performed on block 1A with appropriate controls (in addition to being repeated with flow cytometry) in order to further evaluate the atypical lymphocytes.  The cells of interest demonstrated positive staining for CD 20, [place] 5 and BCL 2 while showing negative straining for CD3 and CD5 (highlights T-cells), BCL6 and CD10 (highlights rare diminutive germinal centers) and cyclin D1.  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    Interpretation:  Taken together these  results demonstrate the presence of clonal population of B lymphocytes.  The immunophenotype in this case in our corrected stick of chronic lymphocytic leukemia/small lymphocytic lymphoma or mantle cell lymphoma.        Imaging:     PET/CT scan: 6/1/22    Chest:   1. There is an area of ground-glass infiltration seen in the left posterior lung.  This measures maximum of approximately 14 mm today versus approximately 8 mm on the previous examination.  The SUV in this area is approximately 1.5.  Given the growth of this lesion this would be suspicious for a possible low-grade malignancy.  2. An ill-defined area of ground-glass infiltrate is seen in the right mid lung field measuring approximately 1.3 cm.  This was not present on the previous examination.  This has an SUV of 1.7  3. A small area of ground-glass infiltrate is seen in the right lower lobe region measuring about 1 cm in diameter this has an SUV in the area of approximately 1.1  4. An area of spiculated parenchymal densities seen in the left lower lobe having a maximum diameter of approximately 2 cm.  This had a diameter of about 1.9 cm on previous examination.  The SUV in this is approximately 3.    Impression:  Multiple areas of parenchymal density in the lungs.  These are either slightly enlarged are new when compared to previous examinations.  Uptake values would be consistent with inflammatory, infectious or low-grade malignancy.      Past Medical History:   Diagnosis Date    Arthritis     COPD (chronic obstructive pulmonary disease)     Hypertension      Family History   Problem Relation Age of Onset    Lung cancer Father     Brain cancer Brother     Breast cancer Paternal Grandmother     Cancer Son      Social History     Socioeconomic History    Marital status:    Tobacco Use    Smoking status: Former Smoker     Years: 15.00    Smokeless tobacco: Never Used   Substance and Sexual Activity    Alcohol use: Not Currently     Past  Surgical History:   Procedure Laterality Date    arm surgery      BACK SURGERY      CHOLECYSTECTOMY      TUBAL LIGATION           Review of systems:  Review of Systems   Constitutional: Positive for activity change. Negative for appetite change, chills, diaphoresis, fatigue and unexpected weight change.   HENT: Negative for congestion, facial swelling, hearing loss, mouth sores, trouble swallowing and voice change.    Eyes: Negative for photophobia, pain, discharge and itching.   Respiratory: Positive for cough and shortness of breath. Negative for apnea, choking and chest tightness.    Cardiovascular: Negative for chest pain, palpitations and leg swelling.   Gastrointestinal: Negative for abdominal distention, abdominal pain, anal bleeding and blood in stool.   Endocrine: Negative for cold intolerance, heat intolerance, polydipsia and polyphagia.   Genitourinary: Negative for difficulty urinating, dysuria, flank pain and hematuria.   Musculoskeletal: Negative for arthralgias, back pain, joint swelling, myalgias, neck pain and neck stiffness.   Skin: Negative for color change, pallor and wound.   Allergic/Immunologic: Positive for environmental allergies. Negative for food allergies and immunocompromised state.   Neurological: Negative for dizziness, seizures, facial asymmetry, speech difficulty, light-headedness, numbness and headaches.   Hematological: Negative for adenopathy. Does not bruise/bleed easily.   Psychiatric/Behavioral: Negative for agitation, behavioral problems, confusion, decreased concentration and sleep disturbance.         Physical Exam  Vitals and nursing note reviewed.   Constitutional:       General: She is not in acute distress.     Appearance: Normal appearance. She is not ill-appearing.   HENT:      Head: Normocephalic and atraumatic.      Nose: No congestion or rhinorrhea.   Eyes:      General: No scleral icterus.     Extraocular Movements: Extraocular movements intact.      Pupils:  Pupils are equal, round, and reactive to light.   Cardiovascular:      Rate and Rhythm: Normal rate and regular rhythm.      Pulses: Normal pulses.      Heart sounds: Normal heart sounds. No murmur heard.    No gallop.   Pulmonary:      Effort: Pulmonary effort is normal. No respiratory distress.      Breath sounds: Normal breath sounds. No stridor. No wheezing or rhonchi.   Abdominal:      General: Bowel sounds are normal. There is no distension.      Palpations: There is no mass.      Tenderness: There is no abdominal tenderness. There is no guarding.   Musculoskeletal:         General: No swelling, tenderness, deformity or signs of injury. Normal range of motion.      Cervical back: Normal range of motion and neck supple. No rigidity. No muscular tenderness.      Right lower leg: No edema.      Left lower leg: No edema.   Skin:     General: Skin is warm.      Coloration: Skin is not jaundiced or pale.      Findings: No bruising or lesion.   Neurological:      General: No focal deficit present.      Mental Status: She is alert and oriented to person, place, and time.      Cranial Nerves: No cranial nerve deficit.      Sensory: No sensory deficit.      Motor: No weakness.      Gait: Gait normal.   Psychiatric:         Mood and Affect: Mood normal.         Behavior: Behavior normal.         Thought Content: Thought content normal.       Vitals:    07/06/22 1025   BP: (!) 133/55   Pulse: (!) 51   Resp: 18   Body surface area is 1.71 meters squared.    Assessment/Plan:      ECOG 2    1. 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     == PET scan does not show any findings beyond the diaphragm and I believe with a lesions noted on both sides of the lung we are likely looking at extranodal marginal zone B-cell lymphoma arising from b/l  lungs.   == 6/20/22:  Hep  panel negative. TMB discussion in the interim and also discussion with Radiation Oncology, also taking into account her lung function.  Per Dr. Ren radiation oncology ISRT is not an option considering we are looking at multiple lesions on bilateral lungs.  Also due to her decreased lung function surgical resection is not a possibility.  Consents is made per TMB discussion was to start systemic treatment with Rituximab.  Will obtain port placement and prior authorization request for rituximab  == 6/27/22:  Patient and daughter here today to discuss upcoming chemotherapy, side effect profile, risk versus benefits, and expected outcomes have been discussed today. All questions and concerns answered and consents have been signed. Port placed while in patient, healing well. Plan is to begin treatment on Thursday, labs reviewed, Hepatitis panel negative. LDH WNL, mild anemia noted.   == 7/6/22:  Patient reports being exposed to COVID yesterday at her longterm home.  She reports 3 COVID vaccines but is unsure when her last booster was.  Will delay treatment for 1 week while patient is under quarantine and encouraged patient to notify clinic if she becomes symptomatic.  Plan is to reschedule cycle 2 Rituxan to Wednesday July 13th, okay to use labs from today.  She return to clinic in 2 weeks prior to cycle 3.     Plan:  Rituximab, weekly x 4 cycles.       Total time spent in counseling and discussion about further management options including relevant lab work, treatment,  prognosis, medications and intended side effects was more than 35 minutes. More than 50% of the time was spent on counseling and coordination of care.  I spent a total of 35 minutes on the day of the visit.This includes face to face time and non-face to face time preparing to see the patient (eg, review of tests), Obtaining and/or reviewing separately obtained history, Documenting clinical information in the electronic or other health record,  Independently interpreting resultsand communicating results to the patient/family/caregiver, or Care coordination.

## 2022-07-11 RX ORDER — MEPERIDINE HYDROCHLORIDE 50 MG/ML
25 INJECTION INTRAMUSCULAR; INTRAVENOUS; SUBCUTANEOUS
Status: CANCELLED | OUTPATIENT
Start: 2022-07-13

## 2022-07-11 RX ORDER — FAMOTIDINE 10 MG/ML
20 INJECTION INTRAVENOUS
Status: CANCELLED | OUTPATIENT
Start: 2022-07-13

## 2022-07-11 RX ORDER — MEPERIDINE HYDROCHLORIDE 50 MG/ML
25 INJECTION INTRAMUSCULAR; INTRAVENOUS; SUBCUTANEOUS
Status: CANCELLED | OUTPATIENT
Start: 2022-07-11

## 2022-07-11 RX ORDER — HEPARIN 100 UNIT/ML
500 SYRINGE INTRAVENOUS
Status: CANCELLED | OUTPATIENT
Start: 2022-07-11

## 2022-07-11 RX ORDER — SODIUM CHLORIDE 0.9 % (FLUSH) 0.9 %
10 SYRINGE (ML) INJECTION
Status: CANCELLED | OUTPATIENT
Start: 2022-07-11

## 2022-07-11 RX ORDER — FAMOTIDINE 10 MG/ML
20 INJECTION INTRAVENOUS
Status: CANCELLED | OUTPATIENT
Start: 2022-07-11

## 2022-07-11 RX ORDER — ACETAMINOPHEN 325 MG/1
650 TABLET ORAL
Status: CANCELLED | OUTPATIENT
Start: 2022-07-13

## 2022-07-11 RX ORDER — SODIUM CHLORIDE 0.9 % (FLUSH) 0.9 %
10 SYRINGE (ML) INJECTION
Status: CANCELLED | OUTPATIENT
Start: 2022-07-13

## 2022-07-11 RX ORDER — ACETAMINOPHEN 325 MG/1
650 TABLET ORAL
Status: CANCELLED | OUTPATIENT
Start: 2022-07-11

## 2022-07-11 RX ORDER — HEPARIN 100 UNIT/ML
500 SYRINGE INTRAVENOUS
Status: CANCELLED | OUTPATIENT
Start: 2022-07-13

## 2022-07-20 ENCOUNTER — OFFICE VISIT (OUTPATIENT)
Dept: HEMATOLOGY/ONCOLOGY | Facility: CLINIC | Age: 82
End: 2022-07-20
Payer: MEDICARE

## 2022-07-20 ENCOUNTER — CLINICAL SUPPORT (OUTPATIENT)
Dept: HEMATOLOGY/ONCOLOGY | Facility: CLINIC | Age: 82
End: 2022-07-20
Payer: MEDICARE

## 2022-07-20 VITALS
OXYGEN SATURATION: 91 % | RESPIRATION RATE: 18 BRPM | DIASTOLIC BLOOD PRESSURE: 67 MMHG | BODY MASS INDEX: 27.18 KG/M2 | HEART RATE: 78 BPM | WEIGHT: 147.69 LBS | SYSTOLIC BLOOD PRESSURE: 134 MMHG | TEMPERATURE: 97 F | HEIGHT: 62 IN

## 2022-07-20 DIAGNOSIS — J06.9 UPPER RESPIRATORY TRACT INFECTION, UNSPECIFIED TYPE: ICD-10-CM

## 2022-07-20 DIAGNOSIS — C88.4 EXTRANODAL MARGINAL ZONE B-CELL LYMPHOMA: ICD-10-CM

## 2022-07-20 DIAGNOSIS — C88.4 EXTRANODAL MARGINAL ZONE B-CELL LYMPHOMA: Primary | ICD-10-CM

## 2022-07-20 LAB
ALBUMIN SERPL BCP-MCNC: 3.2 G/DL (ref 3.4–5)
ALBUMIN/GLOBULIN RATIO: 0.73 RATIO (ref 1.1–1.8)
ALP SERPL-CCNC: 83 U/L (ref 46–116)
ALT SERPL W P-5'-P-CCNC: 17 U/L (ref 12–78)
ANION GAP SERPL CALC-SCNC: 7 MMOL/L (ref 3–11)
AST SERPL-CCNC: 23 U/L (ref 15–37)
BASOPHILS NFR BLD: 0.5 % (ref 0–3)
BILIRUB SERPL-MCNC: 0.6 MG/DL (ref 0–1)
BUN SERPL-MCNC: 17 MG/DL (ref 7–18)
BUN/CREAT SERPL: 16.03 RATIO (ref 7–18)
CALCIUM SERPL-MCNC: 9.2 MG/DL (ref 8.8–10.5)
CHLORIDE SERPL-SCNC: 104 MMOL/L (ref 100–108)
CO2 SERPL-SCNC: 30 MMOL/L (ref 21–32)
CREAT SERPL-MCNC: 1.06 MG/DL (ref 0.55–1.02)
EOSINOPHIL NFR BLD: 2.6 % (ref 1–3)
ERYTHROCYTE [DISTWIDTH] IN BLOOD BY AUTOMATED COUNT: 14.8 % (ref 12.5–18)
GFR ESTIMATION: 49
GLOBULIN: 4.4 G/DL (ref 2.3–3.5)
GLUCOSE SERPL-MCNC: 131 MG/DL (ref 70–110)
HCT VFR BLD AUTO: 38.4 % (ref 37–47)
HGB BLD-MCNC: 12.2 G/DL (ref 12–16)
LDH SERPL L TO P-CCNC: 189 U/L (ref 82–234)
LYMPHOCYTES NFR BLD: 31.3 % (ref 25–40)
MACROCYTES BLD QL SMEAR: NORMAL
MCH RBC QN AUTO: 33 PG (ref 27–31.2)
MCHC RBC AUTO-ENTMCNC: 31.8 G/DL (ref 31.8–35.4)
MCV RBC AUTO: 103.8 FL (ref 80–97)
MONOCYTES NFR BLD: 7.9 % (ref 1–15)
NEUTROPHILS # BLD AUTO: 3.7 10*3/UL (ref 1.8–7.7)
NEUTROPHILS NFR BLD: 57.2 % (ref 37–80)
NUCLEATED RED BLOOD CELLS: 0 %
PLATELETS: 147 10*3/UL (ref 142–424)
POTASSIUM SERPL-SCNC: 3.8 MMOL/L (ref 3.6–5.2)
PROT SERPL-MCNC: 7.6 G/DL (ref 6.4–8.2)
RBC # BLD AUTO: 3.7 10*6/UL (ref 4.2–5.4)
SODIUM BLD-SCNC: 141 MMOL/L (ref 135–145)
WBC # BLD: 6.5 10*3/UL (ref 4.6–10.2)

## 2022-07-20 PROCEDURE — 99214 PR OFFICE/OUTPT VISIT, EST, LEVL IV, 30-39 MIN: ICD-10-PCS | Mod: S$GLB,,, | Performed by: NURSE PRACTITIONER

## 2022-07-20 PROCEDURE — 99214 OFFICE O/P EST MOD 30 MIN: CPT | Mod: S$GLB,,, | Performed by: NURSE PRACTITIONER

## 2022-07-20 RX ORDER — ACETAMINOPHEN 325 MG/1
650 TABLET ORAL
Status: CANCELLED | OUTPATIENT
Start: 2022-07-25

## 2022-07-20 RX ORDER — HEPARIN 100 UNIT/ML
500 SYRINGE INTRAVENOUS
Status: CANCELLED | OUTPATIENT
Start: 2022-07-25

## 2022-07-20 RX ORDER — FAMOTIDINE 10 MG/ML
20 INJECTION INTRAVENOUS
Status: CANCELLED | OUTPATIENT
Start: 2022-07-25

## 2022-07-20 RX ORDER — SODIUM CHLORIDE 0.9 % (FLUSH) 0.9 %
10 SYRINGE (ML) INJECTION
Status: CANCELLED | OUTPATIENT
Start: 2022-07-25

## 2022-07-20 RX ORDER — MEPERIDINE HYDROCHLORIDE 50 MG/ML
25 INJECTION INTRAMUSCULAR; INTRAVENOUS; SUBCUTANEOUS
Status: CANCELLED | OUTPATIENT
Start: 2022-07-25

## 2022-07-20 NOTE — PROGRESS NOTES
MEDICAL ONCOLOGY FOLLOW UP CONSULTATION NOTE    Patient ID: DEMOND Solorzano is a 82 y.o. female.    Chief Complaint: B cell Lymphoma    HPI:  Patient is 82-year-old female with past medical history of COPD, emphysema, bronchitis, depression, gastroesophageal reflux disease, hypertension, congestive heart failure with ejection fraction normalized after biventricular pacing, atelectasis and scarring, degenerative joint disease, hyperlipidemia, sleep apnea, dilated cardiomyopathy, ventricular tachycardia status post ICD, coronary artery disease, chronic back pain, radiculopathy who was referred by Pulmonary after imaging revealed a 1.9 cm left lower lobe nodular density by CT/PET in March of 2019 with SUV 2. Patient underwent repeat PET-CT and left lower lobe CT-guided needle biopsy secondary to enlarging left lower lobe lesion.  Findings were consistent with B-cell lymphoma.  Please see detailed pathology report below.  Patient presents to our clinic today for further evaluation.      Pathology: 06/08/22    Lung nodule, left lower lobe, biopsy:  Extranodal marginal zone B-cell lymphoma (see comment).    Comment:  Sections of the small biopsy show a population of small to medium, round atypical lymphocytes with coarse chromatin and occasional nucleoli, demonstrating moderate cytoplasm and monocyte toyed morphology.  Immunohistochemical stains are performed on block 1A with appropriate controls (in addition to being repeated with flow cytometry) in order to further evaluate the atypical lymphocytes.  The cells of interest demonstrated positive staining for CD 20, Antonito 5 and BCL 2 while showing negative straining for CD3 and CD5 (highlights T-cells), BCL6 and CD10 (highlights rare diminutive germinal centers) and cyclin D1.  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    Interpretation:  Taken together these  results demonstrate the presence of clonal population of B lymphocytes.  The immunophenotype in this case in our corrected stick of chronic lymphocytic leukemia/small lymphocytic lymphoma or mantle cell lymphoma.        Imaging:     PET/CT scan: 6/1/22    Chest:   1. There is an area of ground-glass infiltration seen in the left posterior lung.  This measures maximum of approximately 14 mm today versus approximately 8 mm on the previous examination.  The SUV in this area is approximately 1.5.  Given the growth of this lesion this would be suspicious for a possible low-grade malignancy.  2. An ill-defined area of ground-glass infiltrate is seen in the right mid lung field measuring approximately 1.3 cm.  This was not present on the previous examination.  This has an SUV of 1.7  3. A small area of ground-glass infiltrate is seen in the right lower lobe region measuring about 1 cm in diameter this has an SUV in the area of approximately 1.1  4. An area of spiculated parenchymal densities seen in the left lower lobe having a maximum diameter of approximately 2 cm.  This had a diameter of about 1.9 cm on previous examination.  The SUV in this is approximately 3.    Impression:  Multiple areas of parenchymal density in the lungs.  These are either slightly enlarged are new when compared to previous examinations.  Uptake values would be consistent with inflammatory, infectious or low-grade malignancy.      Past Medical History:   Diagnosis Date    Arthritis     COPD (chronic obstructive pulmonary disease)     Hypertension      Family History   Problem Relation Age of Onset    Lung cancer Father     Brain cancer Brother     Breast cancer Paternal Grandmother     Cancer Son      Social History     Socioeconomic History    Marital status:    Tobacco Use    Smoking status: Former Smoker     Years: 15.00    Smokeless tobacco: Never Used   Substance and Sexual Activity    Alcohol use: Not Currently     Past  Surgical History:   Procedure Laterality Date    arm surgery      BACK SURGERY      CHOLECYSTECTOMY      TUBAL LIGATION           Review of systems:  Review of Systems   Constitutional: Positive for activity change. Negative for appetite change, chills, diaphoresis, fatigue and unexpected weight change.   HENT: Negative for congestion, facial swelling, hearing loss, mouth sores, trouble swallowing and voice change.    Eyes: Negative for photophobia, pain, discharge and itching.   Respiratory: Positive for cough and shortness of breath. Negative for apnea, choking and chest tightness.    Cardiovascular: Negative for chest pain, palpitations and leg swelling.   Gastrointestinal: Negative for abdominal distention, abdominal pain, anal bleeding and blood in stool.   Endocrine: Negative for cold intolerance, heat intolerance, polydipsia and polyphagia.   Genitourinary: Negative for difficulty urinating, dysuria, flank pain and hematuria.   Musculoskeletal: Negative for arthralgias, back pain, joint swelling, myalgias, neck pain and neck stiffness.   Skin: Negative for color change, pallor and wound.   Allergic/Immunologic: Positive for environmental allergies. Negative for food allergies and immunocompromised state.   Neurological: Negative for dizziness, seizures, facial asymmetry, speech difficulty, light-headedness, numbness and headaches.   Hematological: Negative for adenopathy. Does not bruise/bleed easily.   Psychiatric/Behavioral: Negative for agitation, behavioral problems, confusion, decreased concentration and sleep disturbance.         Physical Exam  Vitals and nursing note reviewed.   Constitutional:       General: She is not in acute distress.     Appearance: Normal appearance. She is not ill-appearing.   HENT:      Head: Normocephalic and atraumatic.      Nose: No congestion or rhinorrhea.   Eyes:      General: No scleral icterus.     Extraocular Movements: Extraocular movements intact.      Pupils:  Pupils are equal, round, and reactive to light.   Cardiovascular:      Rate and Rhythm: Normal rate and regular rhythm.      Pulses: Normal pulses.      Heart sounds: Normal heart sounds. No murmur heard.    No gallop.   Pulmonary:      Effort: Pulmonary effort is normal. No respiratory distress.      Breath sounds: Normal breath sounds. No stridor. No wheezing or rhonchi.   Abdominal:      General: Bowel sounds are normal. There is no distension.      Palpations: There is no mass.      Tenderness: There is no abdominal tenderness. There is no guarding.   Musculoskeletal:         General: No swelling, tenderness, deformity or signs of injury. Normal range of motion.      Cervical back: Normal range of motion and neck supple. No rigidity. No muscular tenderness.      Right lower leg: No edema.      Left lower leg: No edema.   Skin:     General: Skin is warm.      Coloration: Skin is not jaundiced or pale.      Findings: No bruising or lesion.   Neurological:      General: No focal deficit present.      Mental Status: She is alert and oriented to person, place, and time.      Cranial Nerves: No cranial nerve deficit.      Sensory: No sensory deficit.      Motor: No weakness.      Gait: Gait normal.   Psychiatric:         Mood and Affect: Mood normal.         Behavior: Behavior normal.         Thought Content: Thought content normal.       Vitals:    07/20/22 1024   BP: 134/67   Pulse: 78   Resp: 18   Temp: 97.3 °F (36.3 °C)   Body surface area is 1.71 meters squared.    Assessment/Plan:      ECOG 2    1. 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     == PET scan does not show any findings beyond the diaphragm and I believe with a lesions noted on both sides of the lung we are likely looking at extranodal marginal zone B-cell lymphoma arising from b/l  lungs.   ==  6/20/22:  Hep panel negative. TMB discussion in the interim and also discussion with Radiation Oncology, also taking into account her lung function.  Per Dr. Ren radiation oncology ISRT is not an option considering we are looking at multiple lesions on bilateral lungs.  Also due to her decreased lung function surgical resection is not a possibility.  Consents is made per TMB discussion was to start systemic treatment with Rituximab.  Will obtain port placement and prior authorization request for rituximab  == 6/27/22:  Patient and daughter here today to discuss upcoming chemotherapy, side effect profile, risk versus benefits, and expected outcomes have been discussed today. All questions and concerns answered and consents have been signed. Port placed while in patient, healing well. Plan is to begin treatment on Thursday, labs reviewed, Hepatitis panel negative. LDH WNL, mild anemia noted.   == 7/6/22:  Patient reports being exposed to COVID yesterday at her residential home.  She reports 3 COVID vaccines but is unsure when her last booster was.  Will delay treatment for 1 week while patient is under quarantine and encouraged patient to notify clinic if she becomes symptomatic.  Plan is to reschedule cycle 2 Rituxan to Wednesday July 13th, okay to use labs from today.  She return to clinic in 2 weeks prior to cycle 3.   == 7/20/22:  Treatment will be delayed today until next Monday, patient was seen by pulmonology Dr. Dixon yesterday and was placed on antibiotic and steroids due to upper respiratory infection.  Infusion has been notified and plan is to complete cycle 3 on July 25, 2022.  No repeat labs needed treatment plan has been signed.  She will return to clinic in 1 week with labs prior to cycle 4 of 4 Rituxan.  Plan:  Rituximab, weekly x 4 cycles.       Total time spent in counseling and discussion about further management options including relevant lab work, treatment,  prognosis, medications and intended  side effects was more than 35 minutes. More than 50% of the time was spent on counseling and coordination of care.  I spent a total of 35 minutes on the day of the visit.This includes face to face time and non-face to face time preparing to see the patient (eg, review of tests), Obtaining and/or reviewing separately obtained history, Documenting clinical information in the electronic or other health record, Independently interpreting resultsand communicating results to the patient/family/caregiver, or Care coordination.

## 2022-08-01 ENCOUNTER — CLINICAL SUPPORT (OUTPATIENT)
Dept: HEMATOLOGY/ONCOLOGY | Facility: CLINIC | Age: 82
End: 2022-08-01
Payer: MEDICARE

## 2022-08-01 ENCOUNTER — OFFICE VISIT (OUTPATIENT)
Dept: HEMATOLOGY/ONCOLOGY | Facility: CLINIC | Age: 82
End: 2022-08-01
Payer: MEDICARE

## 2022-08-01 VITALS
WEIGHT: 147.69 LBS | HEIGHT: 62 IN | HEART RATE: 46 BPM | RESPIRATION RATE: 16 BRPM | OXYGEN SATURATION: 93 % | DIASTOLIC BLOOD PRESSURE: 60 MMHG | SYSTOLIC BLOOD PRESSURE: 136 MMHG | BODY MASS INDEX: 27.18 KG/M2

## 2022-08-01 DIAGNOSIS — C88.4 EXTRANODAL MARGINAL ZONE B-CELL LYMPHOMA: Primary | ICD-10-CM

## 2022-08-01 DIAGNOSIS — C88.4 EXTRANODAL MARGINAL ZONE B-CELL LYMPHOMA: ICD-10-CM

## 2022-08-01 LAB
ALBUMIN SERPL BCP-MCNC: 3.2 G/DL (ref 3.4–5)
ALBUMIN/GLOBULIN RATIO: 0.78 RATIO (ref 1.1–1.8)
ALP SERPL-CCNC: 68 U/L (ref 46–116)
ALT SERPL W P-5'-P-CCNC: 25 U/L (ref 12–78)
ANION GAP SERPL CALC-SCNC: 7 MMOL/L (ref 3–11)
AST SERPL-CCNC: 26 U/L (ref 15–37)
BASOPHILS NFR BLD: 0.9 % (ref 0–3)
BILIRUB SERPL-MCNC: 0.6 MG/DL (ref 0–1)
BUN SERPL-MCNC: 18 MG/DL (ref 7–18)
BUN/CREAT SERPL: 15.38 RATIO (ref 7–18)
CALCIUM SERPL-MCNC: 9.5 MG/DL (ref 8.8–10.5)
CHLORIDE SERPL-SCNC: 108 MMOL/L (ref 100–108)
CO2 SERPL-SCNC: 30 MMOL/L (ref 21–32)
CREAT SERPL-MCNC: 1.17 MG/DL (ref 0.55–1.02)
EOSINOPHIL NFR BLD: 1.9 % (ref 1–3)
ERYTHROCYTE [DISTWIDTH] IN BLOOD BY AUTOMATED COUNT: 14.8 % (ref 12.5–18)
GFR ESTIMATION: 44
GLOBULIN: 4.1 G/DL (ref 2.3–3.5)
GLUCOSE SERPL-MCNC: 121 MG/DL (ref 70–110)
HCT VFR BLD AUTO: 38 % (ref 37–47)
HGB BLD-MCNC: 12.3 G/DL (ref 12–16)
LDH SERPL L TO P-CCNC: 196 U/L (ref 82–234)
LYMPHOCYTES NFR BLD: 27.3 % (ref 25–40)
MACROCYTES BLD QL SMEAR: NORMAL
MCH RBC QN AUTO: 33.2 PG (ref 27–31.2)
MCHC RBC AUTO-ENTMCNC: 32.4 G/DL (ref 31.8–35.4)
MCV RBC AUTO: 102.7 FL (ref 80–97)
MONOCYTES NFR BLD: 8.3 % (ref 1–15)
NEUTROPHILS # BLD AUTO: 4.56 10*3/UL (ref 1.8–7.7)
NEUTROPHILS NFR BLD: 60.8 % (ref 37–80)
NUCLEATED RED BLOOD CELLS: 0 %
PLATELETS: 108 10*3/UL (ref 142–424)
POTASSIUM SERPL-SCNC: 3.6 MMOL/L (ref 3.6–5.2)
PROT SERPL-MCNC: 7.3 G/DL (ref 6.4–8.2)
RBC # BLD AUTO: 3.7 10*6/UL (ref 4.2–5.4)
SODIUM BLD-SCNC: 145 MMOL/L (ref 135–145)
WBC # BLD: 7.5 10*3/UL (ref 4.6–10.2)

## 2022-08-01 PROCEDURE — 99214 PR OFFICE/OUTPT VISIT, EST, LEVL IV, 30-39 MIN: ICD-10-PCS | Mod: S$GLB,,, | Performed by: NURSE PRACTITIONER

## 2022-08-01 PROCEDURE — 99214 OFFICE O/P EST MOD 30 MIN: CPT | Mod: S$GLB,,, | Performed by: NURSE PRACTITIONER

## 2022-08-01 RX ORDER — SODIUM CHLORIDE 0.9 % (FLUSH) 0.9 %
10 SYRINGE (ML) INJECTION
Status: CANCELLED | OUTPATIENT
Start: 2022-08-01

## 2022-08-01 RX ORDER — HEPARIN 100 UNIT/ML
500 SYRINGE INTRAVENOUS
Status: CANCELLED | OUTPATIENT
Start: 2022-08-01

## 2022-08-01 RX ORDER — MEPERIDINE HYDROCHLORIDE 50 MG/ML
25 INJECTION INTRAMUSCULAR; INTRAVENOUS; SUBCUTANEOUS
Status: CANCELLED | OUTPATIENT
Start: 2022-08-01

## 2022-08-01 RX ORDER — FAMOTIDINE 10 MG/ML
20 INJECTION INTRAVENOUS
Status: CANCELLED | OUTPATIENT
Start: 2022-08-01

## 2022-08-01 RX ORDER — ACETAMINOPHEN 325 MG/1
650 TABLET ORAL
Status: CANCELLED | OUTPATIENT
Start: 2022-08-01

## 2022-08-01 NOTE — PROGRESS NOTES
MEDICAL ONCOLOGY FOLLOW UP CONSULTATION NOTE    Patient ID: DEMOND Solorzano is a 82 y.o. female.    Chief Complaint: B cell Lymphoma    HPI:  Patient is 82-year-old female with past medical history of COPD, emphysema, bronchitis, depression, gastroesophageal reflux disease, hypertension, congestive heart failure with ejection fraction normalized after biventricular pacing, atelectasis and scarring, degenerative joint disease, hyperlipidemia, sleep apnea, dilated cardiomyopathy, ventricular tachycardia status post ICD, coronary artery disease, chronic back pain, radiculopathy who was referred by Pulmonary after imaging revealed a 1.9 cm left lower lobe nodular density by CT/PET in March of 2019 with SUV 2. Patient underwent repeat PET-CT and left lower lobe CT-guided needle biopsy secondary to enlarging left lower lobe lesion.  Findings were consistent with B-cell lymphoma.  Please see detailed pathology report below.  Patient presents to our clinic today for further evaluation.      Pathology: 06/08/22    Lung nodule, left lower lobe, biopsy:  Extranodal marginal zone B-cell lymphoma (see comment).    Comment:  Sections of the small biopsy show a population of small to medium, round atypical lymphocytes with coarse chromatin and occasional nucleoli, demonstrating moderate cytoplasm and monocyte toyed morphology.  Immunohistochemical stains are performed on block 1A with appropriate controls (in addition to being repeated with flow cytometry) in order to further evaluate the atypical lymphocytes.  The cells of interest demonstrated positive staining for CD 20, [place] 5 and BCL 2 while showing negative straining for CD3 and CD5 (highlights T-cells), BCL6 and CD10 (highlights rare diminutive germinal centers) and cyclin D1.  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    Interpretation:  Taken together these  results demonstrate the presence of clonal population of B lymphocytes.  The immunophenotype in this case in our corrected stick of chronic lymphocytic leukemia/small lymphocytic lymphoma or mantle cell lymphoma.        Imaging:     PET/CT scan: 6/1/22    Chest:   1. There is an area of ground-glass infiltration seen in the left posterior lung.  This measures maximum of approximately 14 mm today versus approximately 8 mm on the previous examination.  The SUV in this area is approximately 1.5.  Given the growth of this lesion this would be suspicious for a possible low-grade malignancy.  2. An ill-defined area of ground-glass infiltrate is seen in the right mid lung field measuring approximately 1.3 cm.  This was not present on the previous examination.  This has an SUV of 1.7  3. A small area of ground-glass infiltrate is seen in the right lower lobe region measuring about 1 cm in diameter this has an SUV in the area of approximately 1.1  4. An area of spiculated parenchymal densities seen in the left lower lobe having a maximum diameter of approximately 2 cm.  This had a diameter of about 1.9 cm on previous examination.  The SUV in this is approximately 3.    Impression:  Multiple areas of parenchymal density in the lungs.  These are either slightly enlarged are new when compared to previous examinations.  Uptake values would be consistent with inflammatory, infectious or low-grade malignancy.      Past Medical History:   Diagnosis Date    Arthritis     COPD (chronic obstructive pulmonary disease)     Hypertension      Family History   Problem Relation Age of Onset    Lung cancer Father     Brain cancer Brother     Breast cancer Paternal Grandmother     Cancer Son      Social History     Socioeconomic History    Marital status:    Tobacco Use    Smoking status: Former Smoker     Years: 15.00    Smokeless tobacco: Never Used   Substance and Sexual Activity    Alcohol use: Not Currently     Past  Surgical History:   Procedure Laterality Date    arm surgery      BACK SURGERY      CHOLECYSTECTOMY      TUBAL LIGATION           Review of systems:  Review of Systems   Constitutional: Positive for activity change. Negative for appetite change, chills, diaphoresis, fatigue and unexpected weight change.   HENT: Negative for congestion, facial swelling, hearing loss, mouth sores, trouble swallowing and voice change.    Eyes: Negative for photophobia, pain, discharge and itching.   Respiratory: Positive for cough and shortness of breath. Negative for apnea, choking and chest tightness.    Cardiovascular: Negative for chest pain, palpitations and leg swelling.   Gastrointestinal: Negative for abdominal distention, abdominal pain, anal bleeding and blood in stool.   Endocrine: Negative for cold intolerance, heat intolerance, polydipsia and polyphagia.   Genitourinary: Negative for difficulty urinating, dysuria, flank pain and hematuria.   Musculoskeletal: Negative for arthralgias, back pain, joint swelling, myalgias, neck pain and neck stiffness.   Skin: Negative for color change, pallor and wound.   Allergic/Immunologic: Positive for environmental allergies. Negative for food allergies and immunocompromised state.   Neurological: Negative for dizziness, seizures, facial asymmetry, speech difficulty, light-headedness, numbness and headaches.   Hematological: Negative for adenopathy. Does not bruise/bleed easily.   Psychiatric/Behavioral: Negative for agitation, behavioral problems, confusion, decreased concentration and sleep disturbance.         Physical Exam  Vitals and nursing note reviewed.   Constitutional:       General: She is not in acute distress.     Appearance: Normal appearance. She is not ill-appearing.   HENT:      Head: Normocephalic and atraumatic.      Nose: No congestion or rhinorrhea.   Eyes:      General: No scleral icterus.     Extraocular Movements: Extraocular movements intact.      Pupils:  Pupils are equal, round, and reactive to light.   Cardiovascular:      Rate and Rhythm: Normal rate and regular rhythm.      Pulses: Normal pulses.      Heart sounds: Normal heart sounds. No murmur heard.    No gallop.   Pulmonary:      Effort: Pulmonary effort is normal. No respiratory distress.      Breath sounds: Normal breath sounds. No stridor. No wheezing or rhonchi.   Abdominal:      General: Bowel sounds are normal. There is no distension.      Palpations: There is no mass.      Tenderness: There is no abdominal tenderness. There is no guarding.   Musculoskeletal:         General: No swelling, tenderness, deformity or signs of injury. Normal range of motion.      Cervical back: Normal range of motion and neck supple. No rigidity. No muscular tenderness.      Right lower leg: No edema.      Left lower leg: No edema.   Skin:     General: Skin is warm.      Coloration: Skin is not jaundiced or pale.      Findings: No bruising or lesion.   Neurological:      General: No focal deficit present.      Mental Status: She is alert and oriented to person, place, and time.      Cranial Nerves: No cranial nerve deficit.      Sensory: No sensory deficit.      Motor: No weakness.      Gait: Gait normal.   Psychiatric:         Mood and Affect: Mood normal.         Behavior: Behavior normal.         Thought Content: Thought content normal.       Vitals:    08/01/22 1010   BP: 136/60   Pulse: (!) 46   Resp: 16   Body surface area is 1.71 meters squared.    Assessment/Plan:      ECOG 2    1. 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     == PET scan does not show any findings beyond the diaphragm and I believe with a lesions noted on both sides of the lung we are likely looking at extranodal marginal zone B-cell lymphoma arising from b/l  lungs.   == 6/20/22:  Hep panel  negative. TMB discussion in the interim and also discussion with Radiation Oncology, also taking into account her lung function.  Per Dr. Ren radiation oncology ISRT is not an option considering we are looking at multiple lesions on bilateral lungs.  Also due to her decreased lung function surgical resection is not a possibility.  Consents is made per TMB discussion was to start systemic treatment with Rituximab.  Will obtain port placement and prior authorization request for rituximab  == 6/27/22:  Patient and daughter here today to discuss upcoming chemotherapy, side effect profile, risk versus benefits, and expected outcomes have been discussed today. All questions and concerns answered and consents have been signed. Port placed while in patient, healing well. Plan is to begin treatment on Thursday, labs reviewed, Hepatitis panel negative. LDH WNL, mild anemia noted.   == 7/6/22:  Patient reports being exposed to COVID yesterday at her care home home.  She reports 3 COVID vaccines but is unsure when her last booster was.  Will delay treatment for 1 week while patient is under quarantine and encouraged patient to notify clinic if she becomes symptomatic.  Plan is to reschedule cycle 2 Rituxan to Wednesday July 13th, okay to use labs from today.  She return to clinic in 2 weeks prior to cycle 3.   == 7/20/22:  Treatment will be delayed today until next Monday, patient was seen by pulmonology Dr. Dixon yesterday and was placed on antibiotic and steroids due to upper respiratory infection.  Infusion has been notified and plan is to complete cycle 3 on July 25, 2022.  No repeat labs needed treatment plan has been signed.  She will return to clinic in 1 week with labs prior to cycle 4 of 4 Rituxan.  8/1/22: feeling good, no current problems voiced. Labs reviewed and patient cleared for cycle 4 of 4 Rituxan.   Plan:  Pet scan and labs  RTC in 2 weeks to see MD      Total time spent in counseling and discussion  about further management options including relevant lab work, treatment,  prognosis, medications and intended side effects was more than 35 minutes. More than 50% of the time was spent on counseling and coordination of care.  I spent a total of 35 minutes on the day of the visit.This includes face to face time and non-face to face time preparing to see the patient (eg, review of tests), Obtaining and/or reviewing separately obtained history, Documenting clinical information in the electronic or other health record, Independently interpreting resultsand communicating results to the patient/family/caregiver, or Care coordination.

## 2022-08-03 ENCOUNTER — TELEPHONE (OUTPATIENT)
Dept: HEMATOLOGY/ONCOLOGY | Facility: CLINIC | Age: 82
End: 2022-08-03
Payer: MEDICARE

## 2022-08-22 ENCOUNTER — OFFICE VISIT (OUTPATIENT)
Dept: HEMATOLOGY/ONCOLOGY | Facility: CLINIC | Age: 82
End: 2022-08-22
Payer: MEDICARE

## 2022-08-22 VITALS
WEIGHT: 149 LBS | OXYGEN SATURATION: 94 % | RESPIRATION RATE: 15 BRPM | HEIGHT: 62 IN | HEART RATE: 81 BPM | SYSTOLIC BLOOD PRESSURE: 108 MMHG | DIASTOLIC BLOOD PRESSURE: 73 MMHG | BODY MASS INDEX: 27.42 KG/M2

## 2022-08-22 DIAGNOSIS — C88.4 EXTRANODAL MARGINAL ZONE B-CELL LYMPHOMA: Primary | ICD-10-CM

## 2022-08-22 PROCEDURE — 99215 PR OFFICE/OUTPT VISIT, EST, LEVL V, 40-54 MIN: ICD-10-PCS | Mod: S$GLB,,, | Performed by: INTERNAL MEDICINE

## 2022-08-22 PROCEDURE — 99215 OFFICE O/P EST HI 40 MIN: CPT | Mod: S$GLB,,, | Performed by: INTERNAL MEDICINE

## 2022-08-22 NOTE — PROGRESS NOTES
MEDICAL ONCOLOGY FOLLOW UP CONSULTATION NOTE    Patient ID: DEMOND Solorzano is a 82 y.o. female.    Chief Complaint: B cell Lymphoma    HPI:  Patient is 82-year-old female with past medical history of COPD, emphysema, bronchitis, depression, gastroesophageal reflux disease, hypertension, congestive heart failure with ejection fraction normalized after biventricular pacing, atelectasis and scarring, degenerative joint disease, hyperlipidemia, sleep apnea, dilated cardiomyopathy, ventricular tachycardia status post ICD, coronary artery disease, chronic back pain, radiculopathy who was referred by Pulmonary after imaging revealed a 1.9 cm left lower lobe nodular density by CT/PET in March of 2019 with SUV 2. Patient underwent repeat PET-CT and left lower lobe CT-guided needle biopsy secondary to enlarging left lower lobe lesion.  Findings were consistent with B-cell lymphoma.  Please see detailed pathology report below.  Patient presents to our clinic today for further evaluation.      Pathology: 06/08/22    Lung nodule, left lower lobe, biopsy:  Extranodal marginal zone B-cell lymphoma (see comment).    Comment:  Sections of the small biopsy show a population of small to medium, round atypical lymphocytes with coarse chromatin and occasional nucleoli, demonstrating moderate cytoplasm and monocyte toyed morphology.  Immunohistochemical stains are performed on block 1A with appropriate controls (in addition to being repeated with flow cytometry) in order to further evaluate the atypical lymphocytes.  The cells of interest demonstrated positive staining for CD 20, [place] 5 and BCL 2 while showing negative straining for CD3 and CD5 (highlights T-cells), BCL6 and CD10 (highlights rare diminutive germinal centers) and cyclin D1.  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    Interpretation:  Taken together these  results demonstrate the presence of clonal population of B lymphocytes.  The immunophenotype in this case in our corrected stick of chronic lymphocytic leukemia/small lymphocytic lymphoma or mantle cell lymphoma.        Imaging:     PET/CT scan: 6/1/22    Chest:   1. There is an area of ground-glass infiltration seen in the left posterior lung.  This measures maximum of approximately 14 mm today versus approximately 8 mm on the previous examination.  The SUV in this area is approximately 1.5.  Given the growth of this lesion this would be suspicious for a possible low-grade malignancy.  2. An ill-defined area of ground-glass infiltrate is seen in the right mid lung field measuring approximately 1.3 cm.  This was not present on the previous examination.  This has an SUV of 1.7  3. A small area of ground-glass infiltrate is seen in the right lower lobe region measuring about 1 cm in diameter this has an SUV in the area of approximately 1.1  4. An area of spiculated parenchymal densities seen in the left lower lobe having a maximum diameter of approximately 2 cm.  This had a diameter of about 1.9 cm on previous examination.  The SUV in this is approximately 3.    Impression:  Multiple areas of parenchymal density in the lungs.  These are either slightly enlarged are new when compared to previous examinations.  Uptake values would be consistent with inflammatory, infectious or low-grade malignancy.      Past Medical History:   Diagnosis Date    Arthritis     COPD (chronic obstructive pulmonary disease)     Hypertension      Family History   Problem Relation Age of Onset    Lung cancer Father     Brain cancer Brother     Breast cancer Paternal Grandmother     Cancer Son      Social History     Socioeconomic History    Marital status:    Tobacco Use    Smoking status: Former Smoker     Years: 15.00    Smokeless tobacco: Never Used   Substance and Sexual Activity    Alcohol use: Not Currently     Past  Surgical History:   Procedure Laterality Date    arm surgery      BACK SURGERY      CHOLECYSTECTOMY      TUBAL LIGATION           Review of systems:  Review of Systems   Constitutional: Positive for activity change. Negative for appetite change, chills, diaphoresis, fatigue and unexpected weight change.   HENT: Negative for congestion, facial swelling, hearing loss, mouth sores, trouble swallowing and voice change.    Eyes: Negative for photophobia, pain, discharge and itching.   Respiratory: Positive for cough and shortness of breath. Negative for apnea, choking and chest tightness.    Cardiovascular: Negative for chest pain, palpitations and leg swelling.   Gastrointestinal: Negative for abdominal distention, abdominal pain, anal bleeding and blood in stool.   Endocrine: Negative for cold intolerance, heat intolerance, polydipsia and polyphagia.   Genitourinary: Negative for difficulty urinating, dysuria, flank pain and hematuria.   Musculoskeletal: Negative for arthralgias, back pain, joint swelling, myalgias, neck pain and neck stiffness.   Skin: Negative for color change, pallor and wound.   Allergic/Immunologic: Positive for environmental allergies. Negative for food allergies and immunocompromised state.   Neurological: Negative for dizziness, seizures, facial asymmetry, speech difficulty, light-headedness, numbness and headaches.   Hematological: Negative for adenopathy. Does not bruise/bleed easily.   Psychiatric/Behavioral: Negative for agitation, behavioral problems, confusion, decreased concentration and sleep disturbance.         Physical Exam  Vitals and nursing note reviewed.   Constitutional:       General: She is not in acute distress.     Appearance: Normal appearance. She is not ill-appearing.   HENT:      Head: Normocephalic and atraumatic.      Nose: No congestion or rhinorrhea.   Eyes:      General: No scleral icterus.     Extraocular Movements: Extraocular movements intact.      Pupils:  Pupils are equal, round, and reactive to light.   Cardiovascular:      Rate and Rhythm: Normal rate and regular rhythm.      Pulses: Normal pulses.      Heart sounds: Normal heart sounds. No murmur heard.    No gallop.   Pulmonary:      Effort: Pulmonary effort is normal. No respiratory distress.      Breath sounds: Normal breath sounds. No stridor. No wheezing or rhonchi.   Abdominal:      General: Bowel sounds are normal. There is no distension.      Palpations: There is no mass.      Tenderness: There is no abdominal tenderness. There is no guarding.   Musculoskeletal:         General: No swelling, tenderness, deformity or signs of injury. Normal range of motion.      Cervical back: Normal range of motion and neck supple. No rigidity. No muscular tenderness.      Right lower leg: No edema.      Left lower leg: No edema.   Skin:     General: Skin is warm.      Coloration: Skin is not jaundiced or pale.      Findings: No bruising or lesion.   Neurological:      General: No focal deficit present.      Mental Status: She is alert and oriented to person, place, and time.      Cranial Nerves: No cranial nerve deficit.      Sensory: No sensory deficit.      Motor: No weakness.      Gait: Gait normal.   Psychiatric:         Mood and Affect: Mood normal.         Behavior: Behavior normal.         Thought Content: Thought content normal.       Vitals:    08/22/22 1327   BP: 108/73   Pulse: 81   Resp: 15   Body surface area is 1.72 meters squared.    Assessment/Plan:      ECOG 2    1. 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     == PET scan does not show any findings beyond the diaphragm and I believe with a lesions noted on both sides of the lung we are likely looking at extranodal marginal zone B-cell lymphoma arising from b/l  lungs.   == 6/20/22:  Hep panel  negative. TMB discussion in the interim and also discussion with Radiation Oncology, also taking into account her lung function.  Per Dr. Ren radiation oncology ISRT is not an option considering we are looking at multiple lesions on bilateral lungs.  Also due to her decreased lung function surgical resection is not a possibility.  Consents is made per TMB discussion was to start systemic treatment with Rituximab.  Will obtain port placement and prior authorization request for rituximab  == 6/27/22:  Patient and daughter here today to discuss upcoming chemotherapy, side effect profile, risk versus benefits, and expected outcomes have been discussed today. All questions and concerns answered and consents have been signed. Port placed while in patient, healing well. Plan is to begin treatment on Thursday, labs reviewed, Hepatitis panel negative. LDH WNL, mild anemia noted.   == 7/6/22:  Patient reports being exposed to COVID yesterday at her assisted home.  She reports 3 COVID vaccines but is unsure when her last booster was.  Will delay treatment for 1 week while patient is under quarantine and encouraged patient to notify clinic if she becomes symptomatic.  Plan is to reschedule cycle 2 Rituxan to Wednesday July 13th, okay to use labs from today.  She return to clinic in 2 weeks prior to cycle 3.   == 7/20/22:  Treatment will be delayed today until next Monday, patient was seen by pulmonology Dr. Dixon yesterday and was placed on antibiotic and steroids due to upper respiratory infection.  Infusion has been notified and plan is to complete cycle 3 on July 25, 2022.  No repeat labs needed treatment plan has been signed.  She will return to clinic in 1 week with labs prior to cycle 4 of 4 Rituxan.  == 8/22/22: S/p 4 cycles of Rituximab. PET scan shows stable disease with no evidence of progression.  I discussed with her about first-line extended therapy for elderly patients with rituximab 375 mg/meter sq every 12  weeks for 2 years.  She voiced understanding and we will send prior authorization request for consolidation treatment.  PET scan in 3 months for restaging.  Patient in the interim would need port flushes as well.    Plan:   Prior authorization request for consolidation rituximab Q 3 monthly for 2 years  Port flush  PET scan for restaging in 3 months  Return to clinic in 6 weeks for MD visit with labs for follow-up and to start maintenance rituximab infusion    Total time spent in counseling and discussion about further management options including relevant lab work, treatment,  prognosis, medications and intended side effects was more than 60 minutes. More than 50% of the time was spent on counseling and coordination of care.  I spent a total of 60 minutes on the day of the visit.This includes face to face time and non-face to face time preparing to see the patient (eg, review of tests), Obtaining and/or reviewing separately obtained history, Documenting clinical information in the electronic or other health record, Independently interpreting resultsand communicating results to the patient/family/caregiver, or Care coordination.

## 2022-09-30 LAB
ALBUMIN SERPL BCP-MCNC: 3.6 G/DL (ref 3.4–5)
ALBUMIN/GLOBULIN RATIO: 0.82 RATIO (ref 1.1–1.8)
ALP SERPL-CCNC: 86 U/L (ref 46–116)
ALT SERPL W P-5'-P-CCNC: 22 U/L (ref 12–78)
ANION GAP SERPL CALC-SCNC: 7 MMOL/L (ref 3–11)
AST SERPL-CCNC: 22 U/L (ref 15–37)
BASOPHILS NFR BLD: 1.2 % (ref 0–3)
BILIRUB SERPL-MCNC: 0.5 MG/DL (ref 0–1)
BUN SERPL-MCNC: 19 MG/DL (ref 7–18)
BUN/CREAT SERPL: 17.59 RATIO (ref 7–18)
CALCIUM SERPL-MCNC: 9.5 MG/DL (ref 8.8–10.5)
CHLORIDE SERPL-SCNC: 104 MMOL/L (ref 100–108)
CO2 SERPL-SCNC: 31 MMOL/L (ref 21–32)
CREAT SERPL-MCNC: 1.08 MG/DL (ref 0.55–1.02)
EOSINOPHIL NFR BLD: 2.6 % (ref 1–3)
ERYTHROCYTE [DISTWIDTH] IN BLOOD BY AUTOMATED COUNT: 14.6 % (ref 12.5–18)
GFR ESTIMATION: 48
GLOBULIN: 4.4 G/DL (ref 2.3–3.5)
GLUCOSE SERPL-MCNC: 119 MG/DL (ref 70–110)
HCT VFR BLD AUTO: 38.2 % (ref 37–47)
HGB BLD-MCNC: 12.5 G/DL (ref 12–16)
LDH SERPL L TO P-CCNC: 189 U/L (ref 82–234)
LYMPHOCYTES NFR BLD: 33.2 % (ref 25–40)
MACROCYTES BLD QL SMEAR: NORMAL
MCH RBC QN AUTO: 33.6 PG (ref 27–31.2)
MCHC RBC AUTO-ENTMCNC: 32.7 G/DL (ref 31.8–35.4)
MCV RBC AUTO: 102.7 FL (ref 80–97)
MONOCYTES NFR BLD: 0.9 % (ref 1–15)
NEUTROPHILS # BLD AUTO: 3.57 10*3/UL (ref 1.8–7.7)
NEUTROPHILS NFR BLD: 61.4 % (ref 37–80)
NUCLEATED RED BLOOD CELLS: 0 %
PLATELETS: 139 10*3/UL (ref 142–424)
POTASSIUM SERPL-SCNC: 3.9 MMOL/L (ref 3.6–5.2)
PROT SERPL-MCNC: 8 G/DL (ref 6.4–8.2)
RBC # BLD AUTO: 3.72 10*6/UL (ref 4.2–5.4)
SODIUM BLD-SCNC: 142 MMOL/L (ref 135–145)
WBC # BLD: 5.8 10*3/UL (ref 4.6–10.2)

## 2022-10-03 ENCOUNTER — OFFICE VISIT (OUTPATIENT)
Dept: HEMATOLOGY/ONCOLOGY | Facility: CLINIC | Age: 82
End: 2022-10-03
Payer: MEDICARE

## 2022-10-03 VITALS
SYSTOLIC BLOOD PRESSURE: 122 MMHG | WEIGHT: 147 LBS | BODY MASS INDEX: 27.05 KG/M2 | HEIGHT: 62 IN | HEART RATE: 77 BPM | RESPIRATION RATE: 15 BRPM | OXYGEN SATURATION: 98 % | DIASTOLIC BLOOD PRESSURE: 78 MMHG

## 2022-10-03 DIAGNOSIS — C88.4 EXTRANODAL MARGINAL ZONE B-CELL LYMPHOMA: Primary | ICD-10-CM

## 2022-10-03 PROCEDURE — 99214 OFFICE O/P EST MOD 30 MIN: CPT | Mod: S$GLB,,, | Performed by: INTERNAL MEDICINE

## 2022-10-03 PROCEDURE — 99214 PR OFFICE/OUTPT VISIT, EST, LEVL IV, 30-39 MIN: ICD-10-PCS | Mod: S$GLB,,, | Performed by: INTERNAL MEDICINE

## 2022-10-03 RX ORDER — FAMOTIDINE 10 MG/ML
20 INJECTION INTRAVENOUS
Status: CANCELLED | OUTPATIENT
Start: 2022-10-03

## 2022-10-03 RX ORDER — ACETAMINOPHEN 325 MG/1
650 TABLET ORAL
Status: CANCELLED | OUTPATIENT
Start: 2022-10-03

## 2022-10-03 RX ORDER — MEPERIDINE HYDROCHLORIDE 50 MG/ML
25 INJECTION INTRAMUSCULAR; INTRAVENOUS; SUBCUTANEOUS
Status: CANCELLED | OUTPATIENT
Start: 2022-10-03

## 2022-10-03 RX ORDER — HEPARIN 100 UNIT/ML
500 SYRINGE INTRAVENOUS
Status: CANCELLED | OUTPATIENT
Start: 2022-10-03

## 2022-10-03 RX ORDER — SODIUM CHLORIDE 0.9 % (FLUSH) 0.9 %
10 SYRINGE (ML) INJECTION
Status: CANCELLED | OUTPATIENT
Start: 2022-10-03

## 2022-10-03 NOTE — PROGRESS NOTES
MEDICAL ONCOLOGY FOLLOW UP CONSULTATION NOTE    Patient ID: DEMOND Solorzano is a 82 y.o. female.    Chief Complaint: B cell Lymphoma    HPI:  Patient is 82-year-old female with past medical history of COPD, emphysema, bronchitis, depression, gastroesophageal reflux disease, hypertension, congestive heart failure with ejection fraction normalized after biventricular pacing, atelectasis and scarring, degenerative joint disease, hyperlipidemia, sleep apnea, dilated cardiomyopathy, ventricular tachycardia status post ICD, coronary artery disease, chronic back pain, radiculopathy who was referred by Pulmonary after imaging revealed a 1.9 cm left lower lobe nodular density by CT/PET in March of 2019 with SUV 2. Patient underwent repeat PET-CT and left lower lobe CT-guided needle biopsy secondary to enlarging left lower lobe lesion.  Findings were consistent with B-cell lymphoma.  Please see detailed pathology report below.  Patient presents to our clinic today for further evaluation.      Pathology: 06/08/22    Lung nodule, left lower lobe, biopsy:  Extranodal marginal zone B-cell lymphoma (see comment).    Comment:  Sections of the small biopsy show a population of small to medium, round atypical lymphocytes with coarse chromatin and occasional nucleoli, demonstrating moderate cytoplasm and monocyte toyed morphology.  Immunohistochemical stains are performed on block 1A with appropriate controls (in addition to being repeated with flow cytometry) in order to further evaluate the atypical lymphocytes.  The cells of interest demonstrated positive staining for CD 20, [place] 5 and BCL 2 while showing negative straining for CD3 and CD5 (highlights T-cells), BCL6 and CD10 (highlights rare diminutive germinal centers) and cyclin D1.  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    Interpretation:  Taken together these  results demonstrate the presence of clonal population of B lymphocytes.  The immunophenotype in this case in our corrected stick of chronic lymphocytic leukemia/small lymphocytic lymphoma or mantle cell lymphoma.        Imaging:     PET/CT scan: 6/1/22    Chest:   1. There is an area of ground-glass infiltration seen in the left posterior lung.  This measures maximum of approximately 14 mm today versus approximately 8 mm on the previous examination.  The SUV in this area is approximately 1.5.  Given the growth of this lesion this would be suspicious for a possible low-grade malignancy.  2. An ill-defined area of ground-glass infiltrate is seen in the right mid lung field measuring approximately 1.3 cm.  This was not present on the previous examination.  This has an SUV of 1.7  3. A small area of ground-glass infiltrate is seen in the right lower lobe region measuring about 1 cm in diameter this has an SUV in the area of approximately 1.1  4. An area of spiculated parenchymal densities seen in the left lower lobe having a maximum diameter of approximately 2 cm.  This had a diameter of about 1.9 cm on previous examination.  The SUV in this is approximately 3.    Impression:  Multiple areas of parenchymal density in the lungs.  These are either slightly enlarged are new when compared to previous examinations.  Uptake values would be consistent with inflammatory, infectious or low-grade malignancy.      Past Medical History:   Diagnosis Date    Arthritis     COPD (chronic obstructive pulmonary disease)     Hypertension      Family History   Problem Relation Age of Onset    Lung cancer Father     Brain cancer Brother     Breast cancer Paternal Grandmother     Cancer Son      Social History     Socioeconomic History    Marital status:    Tobacco Use    Smoking status: Former     Years: 15.00     Types: Cigarettes    Smokeless tobacco: Never   Substance and Sexual Activity    Alcohol use: Not Currently    Drug use:  Never     Past Surgical History:   Procedure Laterality Date    arm surgery      BACK SURGERY      CHOLECYSTECTOMY      TUBAL LIGATION           Review of systems:  Review of Systems   Constitutional:  Positive for activity change. Negative for appetite change, chills, diaphoresis, fatigue and unexpected weight change.   HENT:  Negative for congestion, facial swelling, hearing loss, mouth sores, trouble swallowing and voice change.    Eyes:  Negative for photophobia, pain, discharge and itching.   Respiratory:  Positive for cough and shortness of breath. Negative for apnea, choking and chest tightness.    Cardiovascular:  Negative for chest pain, palpitations and leg swelling.   Gastrointestinal:  Negative for abdominal distention, abdominal pain, anal bleeding and blood in stool.   Endocrine: Negative for cold intolerance, heat intolerance, polydipsia and polyphagia.   Genitourinary:  Negative for difficulty urinating, dysuria, flank pain and hematuria.   Musculoskeletal:  Negative for arthralgias, back pain, joint swelling, myalgias, neck pain and neck stiffness.   Skin:  Negative for color change, pallor and wound.   Allergic/Immunologic: Positive for environmental allergies. Negative for food allergies and immunocompromised state.   Neurological:  Negative for dizziness, seizures, facial asymmetry, speech difficulty, light-headedness, numbness and headaches.   Hematological:  Negative for adenopathy. Does not bruise/bleed easily.   Psychiatric/Behavioral:  Negative for agitation, behavioral problems, confusion, decreased concentration and sleep disturbance.        Physical Exam  Vitals and nursing note reviewed.   Constitutional:       General: She is not in acute distress.     Appearance: Normal appearance. She is not ill-appearing.   HENT:      Head: Normocephalic and atraumatic.      Nose: No congestion or rhinorrhea.   Eyes:      General: No scleral icterus.     Extraocular Movements: Extraocular movements  intact.      Pupils: Pupils are equal, round, and reactive to light.   Cardiovascular:      Rate and Rhythm: Normal rate and regular rhythm.      Pulses: Normal pulses.      Heart sounds: Normal heart sounds. No murmur heard.    No gallop.   Pulmonary:      Effort: Pulmonary effort is normal. No respiratory distress.      Breath sounds: Normal breath sounds. No stridor. No wheezing or rhonchi.   Abdominal:      General: Bowel sounds are normal. There is no distension.      Palpations: There is no mass.      Tenderness: There is no abdominal tenderness. There is no guarding.   Musculoskeletal:         General: No swelling, tenderness, deformity or signs of injury. Normal range of motion.      Cervical back: Normal range of motion and neck supple. No rigidity. No muscular tenderness.      Right lower leg: No edema.      Left lower leg: No edema.   Skin:     General: Skin is warm.      Coloration: Skin is not jaundiced or pale.      Findings: No bruising or lesion.   Neurological:      General: No focal deficit present.      Mental Status: She is alert and oriented to person, place, and time.      Cranial Nerves: No cranial nerve deficit.      Sensory: No sensory deficit.      Motor: No weakness.      Gait: Gait normal.   Psychiatric:         Mood and Affect: Mood normal.         Behavior: Behavior normal.         Thought Content: Thought content normal.     Vitals:    10/03/22 0904   BP: 122/78   Pulse: 77   Resp: 15   Body surface area is 1.71 meters squared.    Assessment/Plan:      ECOG 2    1. 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     == PET scan does not show any findings beyond the diaphragm and I believe with a lesions noted on both sides of the lung we are likely looking at extranodal marginal zone B-cell lymphoma arising from b/l  lungs.   ==  6/20/22:  Hep panel negative. TMB discussion in the interim and also discussion with Radiation Oncology, also taking into account her lung function.  Per Dr. Ren radiation oncology ISRT is not an option considering we are looking at multiple lesions on bilateral lungs.  Also due to her decreased lung function surgical resection is not a possibility.  Consents is made per TMB discussion was to start systemic treatment with Rituximab.  Will obtain port placement and prior authorization request for rituximab  == 6/27/22:  Patient and daughter here today to discuss upcoming chemotherapy, side effect profile, risk versus benefits, and expected outcomes have been discussed today. All questions and concerns answered and consents have been signed. Port placed while in patient, healing well. Plan is to begin treatment on Thursday, labs reviewed, Hepatitis panel negative. LDH WNL, mild anemia noted.   == 7/6/22:  Patient reports being exposed to COVID yesterday at her [facility] home.  She reports 3 COVID vaccines but is unsure when her last booster was.  Will delay treatment for 1 week while patient is under quarantine and encouraged patient to notify clinic if she becomes symptomatic.  Plan is to reschedule cycle 2 Rituxan to Wednesday July 13th, okay to use labs from today.  She return to clinic in 2 weeks prior to cycle 3.   == 7/20/22:  Treatment will be delayed today until next Monday, patient was seen by pulmonology Dr. Dixon yesterday and was placed on antibiotic and steroids due to upper respiratory infection.  Infusion has been notified and plan is to complete cycle 3 on July 25, 2022.  No repeat labs needed treatment plan has been signed.  She will return to clinic in 1 week with labs prior to cycle 4 of 4 Rituxan.  == 10/3/22: S/p 4 cycles of Rituximab. PET scan 08/22 showed stable disease with no evidence of progression.  Start maintenance Rituximab for consolidation--- rituximab Q 3 monthly for 2  years      Port flushes per schedule  PET scan for restaging  prior to her next appointment       Return to clinic in 12  weeks for MD visit with labs for follow-up and rituximab treatment    Total time spent in counseling and discussion about further management options including relevant lab work, treatment,  prognosis, medications and intended side effects was more than 25 minutes. More than 50% of the time was spent on counseling and coordination of care.  I spent a total of 25 minutes on the day of the visit.This includes face to face time and non-face to face time preparing to see the patient (eg, review of tests), Obtaining and/or reviewing separately obtained history, Documenting clinical information in the electronic or other health record, Independently interpreting resultsand communicating results to the patient/family/caregiver, or Care coordination.

## 2022-12-29 DIAGNOSIS — C88.4 EXTRANODAL MARGINAL ZONE B-CELL LYMPHOMA: Primary | ICD-10-CM

## 2023-01-03 ENCOUNTER — TELEPHONE (OUTPATIENT)
Dept: HEMATOLOGY/ONCOLOGY | Facility: CLINIC | Age: 83
End: 2023-01-03

## 2023-01-03 ENCOUNTER — OFFICE VISIT (OUTPATIENT)
Dept: HEMATOLOGY/ONCOLOGY | Facility: CLINIC | Age: 83
End: 2023-01-03
Payer: MEDICARE

## 2023-01-03 VITALS
WEIGHT: 155.69 LBS | HEIGHT: 62 IN | SYSTOLIC BLOOD PRESSURE: 128 MMHG | OXYGEN SATURATION: 97 % | TEMPERATURE: 98 F | RESPIRATION RATE: 16 BRPM | HEART RATE: 69 BPM | BODY MASS INDEX: 28.65 KG/M2 | DIASTOLIC BLOOD PRESSURE: 78 MMHG

## 2023-01-03 DIAGNOSIS — C88.4 EXTRANODAL MARGINAL ZONE B-CELL LYMPHOMA: Primary | ICD-10-CM

## 2023-01-03 LAB
ALBUMIN SERPL BCP-MCNC: 3.6 G/DL (ref 3.4–5)
ALBUMIN/GLOBULIN RATIO: 0.86 RATIO (ref 1.1–1.8)
ALP SERPL-CCNC: 70 U/L (ref 46–116)
ALT SERPL W P-5'-P-CCNC: 20 U/L (ref 12–78)
ANION GAP SERPL CALC-SCNC: 5 MMOL/L (ref 3–11)
AST SERPL-CCNC: 23 U/L (ref 15–37)
BASOPHILS NFR BLD: 0.7 % (ref 0–3)
BILIRUB SERPL-MCNC: 0.5 MG/DL (ref 0–1)
BUN SERPL-MCNC: 22 MG/DL (ref 7–18)
BUN/CREAT SERPL: 19.29 RATIO (ref 7–18)
CALCIUM SERPL-MCNC: 9.6 MG/DL (ref 8.8–10.5)
CHLORIDE SERPL-SCNC: 105 MMOL/L (ref 100–108)
CO2 SERPL-SCNC: 31 MMOL/L (ref 21–32)
CREAT SERPL-MCNC: 1.14 MG/DL (ref 0.55–1.02)
EOSINOPHIL NFR BLD: 3.3 % (ref 1–3)
ERYTHROCYTE [DISTWIDTH] IN BLOOD BY AUTOMATED COUNT: 14.8 % (ref 12.5–18)
GFR ESTIMATION: 45
GLOBULIN: 4.2 G/DL (ref 2.3–3.5)
GLUCOSE SERPL-MCNC: 100 MG/DL (ref 70–110)
HCT VFR BLD AUTO: 36.3 % (ref 37–47)
HGB BLD-MCNC: 12 G/DL (ref 12–16)
LDH SERPL L TO P-CCNC: 255 U/L (ref 82–234)
LYMPHOCYTES NFR BLD: 29.4 % (ref 25–40)
MACROCYTES BLD QL SMEAR: NORMAL
MAGNESIUM SERPL-MCNC: 2.2 MG/DL (ref 1.8–2.4)
MCH RBC QN AUTO: 34.6 PG (ref 27–31.2)
MCHC RBC AUTO-ENTMCNC: 33.1 G/DL (ref 31.8–35.4)
MCV RBC AUTO: 104.6 FL (ref 80–97)
MONOCYTES NFR BLD: 9.2 % (ref 1–15)
NEUTROPHILS # BLD AUTO: 3.11 10*3/UL (ref 1.8–7.7)
NEUTROPHILS NFR BLD: 57 % (ref 37–80)
NUCLEATED RED BLOOD CELLS: 0 %
PLATELETS: 121 10*3/UL (ref 142–424)
POTASSIUM SERPL-SCNC: 4.4 MMOL/L (ref 3.6–5.2)
PROT SERPL-MCNC: 7.8 G/DL (ref 6.4–8.2)
RBC # BLD AUTO: 3.47 10*6/UL (ref 4.2–5.4)
SODIUM BLD-SCNC: 141 MMOL/L (ref 135–145)
WBC # BLD: 5.5 10*3/UL (ref 4.6–10.2)

## 2023-01-03 PROCEDURE — 99214 PR OFFICE/OUTPT VISIT, EST, LEVL IV, 30-39 MIN: ICD-10-PCS | Mod: GV,S$GLB,ICN, | Performed by: NURSE PRACTITIONER

## 2023-01-03 PROCEDURE — 99214 OFFICE O/P EST MOD 30 MIN: CPT | Mod: GV,S$GLB,ICN, | Performed by: NURSE PRACTITIONER

## 2023-01-03 RX ORDER — SODIUM CHLORIDE 0.9 % (FLUSH) 0.9 %
10 SYRINGE (ML) INJECTION
Status: CANCELLED | OUTPATIENT
Start: 2023-01-03

## 2023-01-03 RX ORDER — ACETAMINOPHEN 325 MG/1
650 TABLET ORAL
Status: CANCELLED | OUTPATIENT
Start: 2023-01-03

## 2023-01-03 RX ORDER — MEPERIDINE HYDROCHLORIDE 50 MG/ML
25 INJECTION INTRAMUSCULAR; INTRAVENOUS; SUBCUTANEOUS
Status: CANCELLED | OUTPATIENT
Start: 2023-01-03

## 2023-01-03 RX ORDER — FAMOTIDINE 20 MG/1
20 TABLET, FILM COATED ORAL
Status: CANCELLED | OUTPATIENT
Start: 2023-01-03

## 2023-01-03 RX ORDER — DIPHENHYDRAMINE HCL 25 MG
50 CAPSULE ORAL
Status: CANCELLED | OUTPATIENT
Start: 2023-01-03

## 2023-01-03 RX ORDER — HEPARIN 100 UNIT/ML
500 SYRINGE INTRAVENOUS
Status: CANCELLED | OUTPATIENT
Start: 2023-01-03

## 2023-01-03 NOTE — PROGRESS NOTES
MEDICAL ONCOLOGY FOLLOW UP CONSULTATION NOTE    Patient ID: DEMOND Solorzano is a 82 y.o. female.    Chief Complaint: B cell Lymphoma    HPI:  Patient is 82-year-old female with past medical history of COPD, emphysema, bronchitis, depression, gastroesophageal reflux disease, hypertension, congestive heart failure with ejection fraction normalized after biventricular pacing, atelectasis and scarring, degenerative joint disease, hyperlipidemia, sleep apnea, dilated cardiomyopathy, ventricular tachycardia status post ICD, coronary artery disease, chronic back pain, radiculopathy who was referred by Pulmonary after imaging revealed a 1.9 cm left lower lobe nodular density by CT/PET in March of 2019 with SUV 2. Patient underwent repeat PET-CT and left lower lobe CT-guided needle biopsy secondary to enlarging left lower lobe lesion.  Findings were consistent with B-cell lymphoma.  Please see detailed pathology report below.  Patient presents to our clinic today for further evaluation.      Pathology: 06/08/22    Lung nodule, left lower lobe, biopsy:  Extranodal marginal zone B-cell lymphoma (see comment).    Comment:  Sections of the small biopsy show a population of small to medium, round atypical lymphocytes with coarse chromatin and occasional nucleoli, demonstrating moderate cytoplasm and monocyte toyed morphology.  Immunohistochemical stains are performed on block 1A with appropriate controls (in addition to being repeated with flow cytometry) in order to further evaluate the atypical lymphocytes.  The cells of interest demonstrated positive staining for CD 20, [place] 5 and BCL 2 while showing negative straining for CD3 and CD5 (highlights T-cells), BCL6 and CD10 (highlights rare diminutive germinal centers) and cyclin D1.  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    Interpretation:  Taken together these  results demonstrate the presence of clonal population of B lymphocytes.  The immunophenotype in this case in our corrected stick of chronic lymphocytic leukemia/small lymphocytic lymphoma or mantle cell lymphoma.        Imaging:     PET/CT scan: 6/1/22    Chest:   1. There is an area of ground-glass infiltration seen in the left posterior lung.  This measures maximum of approximately 14 mm today versus approximately 8 mm on the previous examination.  The SUV in this area is approximately 1.5.  Given the growth of this lesion this would be suspicious for a possible low-grade malignancy.  2. An ill-defined area of ground-glass infiltrate is seen in the right mid lung field measuring approximately 1.3 cm.  This was not present on the previous examination.  This has an SUV of 1.7  3. A small area of ground-glass infiltrate is seen in the right lower lobe region measuring about 1 cm in diameter this has an SUV in the area of approximately 1.1  4. An area of spiculated parenchymal densities seen in the left lower lobe having a maximum diameter of approximately 2 cm.  This had a diameter of about 1.9 cm on previous examination.  The SUV in this is approximately 3.    Impression:  Multiple areas of parenchymal density in the lungs.  These are either slightly enlarged are new when compared to previous examinations.  Uptake values would be consistent with inflammatory, infectious or low-grade malignancy.      Past Medical History:   Diagnosis Date    Arthritis     COPD (chronic obstructive pulmonary disease)     Hypertension      Family History   Problem Relation Age of Onset    Lung cancer Father     Brain cancer Brother     Breast cancer Paternal Grandmother     Cancer Son      Social History     Socioeconomic History    Marital status:    Tobacco Use    Smoking status: Former     Years: 15.00     Types: Cigarettes    Smokeless tobacco: Never   Substance and Sexual Activity    Alcohol use: Not Currently    Drug use:  Never     Past Surgical History:   Procedure Laterality Date    arm surgery      BACK SURGERY      CHOLECYSTECTOMY      TUBAL LIGATION           Review of systems:  Review of Systems   Constitutional:  Positive for activity change. Negative for appetite change, chills, diaphoresis, fatigue and unexpected weight change.   HENT:  Negative for congestion, facial swelling, hearing loss, mouth sores, trouble swallowing and voice change.    Eyes:  Negative for photophobia, pain, discharge and itching.   Respiratory:  Positive for cough and shortness of breath. Negative for apnea, choking and chest tightness.    Cardiovascular:  Negative for chest pain, palpitations and leg swelling.   Gastrointestinal:  Negative for abdominal distention, abdominal pain, anal bleeding and blood in stool.   Endocrine: Negative for cold intolerance, heat intolerance, polydipsia and polyphagia.   Genitourinary:  Negative for difficulty urinating, dysuria, flank pain and hematuria.   Musculoskeletal:  Negative for arthralgias, back pain, joint swelling, myalgias, neck pain and neck stiffness.   Skin:  Negative for color change, pallor and wound.   Allergic/Immunologic: Positive for environmental allergies. Negative for food allergies and immunocompromised state.   Neurological:  Negative for dizziness, seizures, facial asymmetry, speech difficulty, light-headedness, numbness and headaches.   Hematological:  Negative for adenopathy. Does not bruise/bleed easily.   Psychiatric/Behavioral:  Negative for agitation, behavioral problems, confusion, decreased concentration and sleep disturbance.        Physical Exam  Vitals and nursing note reviewed.   Constitutional:       General: She is not in acute distress.     Appearance: Normal appearance. She is not ill-appearing.   HENT:      Head: Normocephalic and atraumatic.      Nose: No congestion or rhinorrhea.   Eyes:      General: No scleral icterus.     Extraocular Movements: Extraocular movements  intact.      Pupils: Pupils are equal, round, and reactive to light.   Cardiovascular:      Rate and Rhythm: Normal rate and regular rhythm.      Pulses: Normal pulses.      Heart sounds: Normal heart sounds. No murmur heard.    No gallop.   Pulmonary:      Effort: Pulmonary effort is normal. No respiratory distress.      Breath sounds: Normal breath sounds. No stridor. No wheezing or rhonchi.   Abdominal:      General: Bowel sounds are normal. There is no distension.      Palpations: There is no mass.      Tenderness: There is no abdominal tenderness. There is no guarding.   Musculoskeletal:         General: No swelling, tenderness, deformity or signs of injury. Normal range of motion.      Cervical back: Normal range of motion and neck supple. No rigidity. No muscular tenderness.      Right lower leg: No edema.      Left lower leg: No edema.   Skin:     General: Skin is warm.      Coloration: Skin is not jaundiced or pale.      Findings: No bruising or lesion.   Neurological:      General: No focal deficit present.      Mental Status: She is alert and oriented to person, place, and time.      Cranial Nerves: No cranial nerve deficit.      Sensory: No sensory deficit.      Motor: No weakness.      Gait: Gait normal.   Psychiatric:         Mood and Affect: Mood normal.         Behavior: Behavior normal.         Thought Content: Thought content normal.     Vitals:    01/03/23 1012   BP: 128/78   Pulse: 69   Resp: 16   Temp: 97.7 °F (36.5 °C)   Body surface area is 1.76 meters squared.    Assessment/Plan:      ECOG 2    1. 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     == PET scan does not show any findings beyond the diaphragm and I believe with a lesions noted on both sides of the lung we are likely looking at extranodal marginal zone B-cell lymphoma arising  from b/l  lungs.   == 6/20/22:  Hep panel negative. TMB discussion in the interim and also discussion with Radiation Oncology, also taking into account her lung function.  Per Dr. Ren radiation oncology ISRT is not an option considering we are looking at multiple lesions on bilateral lungs.  Also due to her decreased lung function surgical resection is not a possibility.  Consents is made per TMB discussion was to start systemic treatment with Rituximab.  Will obtain port placement and prior authorization request for rituximab  == 6/27/22:  Patient and daughter here today to discuss upcoming chemotherapy, side effect profile, risk versus benefits, and expected outcomes have been discussed today. All questions and concerns answered and consents have been signed. Port placed while in patient, healing well. Plan is to begin treatment on Thursday, labs reviewed, Hepatitis panel negative. LDH WNL, mild anemia noted.   == 7/6/22:  Patient reports being exposed to COVID yesterday at her senior care home.  She reports 3 COVID vaccines but is unsure when her last booster was.  Will delay treatment for 1 week while patient is under quarantine and encouraged patient to notify clinic if she becomes symptomatic.  Plan is to reschedule cycle 2 Rituxan to Wednesday July 13th, okay to use labs from today.  She return to clinic in 2 weeks prior to cycle 3.   == 7/20/22:  Treatment will be delayed today until next Monday, patient was seen by pulmonology Dr. Dixon yesterday and was placed on antibiotic and steroids due to upper respiratory infection.  Infusion has been notified and plan is to complete cycle 3 on July 25, 2022.  No repeat labs needed treatment plan has been signed.  She will return to clinic in 1 week with labs prior to cycle 4 of 4 Rituxan.  == 12/3/23: S/p 4 cycles of Rituximab. PET scan 08/22 showed stable disease with no evidence of progression.  Continue maintenance Rituximab for consolidation--- rituximab Q 3  monthly for 2 years      Port flushes per schedule  PET scan for restaging  prior to her next appointment       Return to clinic in 12  weeks for MD visit with labs for follow-up and rituximab treatment    Total time spent in counseling and discussion about further management options including relevant lab work, treatment,  prognosis, medications and intended side effects was more than 25 minutes. More than 50% of the time was spent on counseling and coordination of care.  I spent a total of 25 minutes on the day of the visit.This includes face to face time and non-face to face time preparing to see the patient (eg, review of tests), Obtaining and/or reviewing separately obtained history, Documenting clinical information in the electronic or other health record, Independently interpreting resultsand communicating results to the patient/family/caregiver, or Care coordination.

## 2023-03-22 DIAGNOSIS — C88.4 EXTRANODAL MARGINAL ZONE B-CELL LYMPHOMA: Primary | ICD-10-CM

## 2023-03-27 LAB
ALBUMIN SERPL BCP-MCNC: 3.3 G/DL (ref 3.4–5)
ALBUMIN/GLOBULIN RATIO: 0.87 RATIO (ref 1.1–1.8)
ALP SERPL-CCNC: 80 U/L (ref 46–116)
ALT SERPL W P-5'-P-CCNC: 30 U/L (ref 12–78)
ANION GAP SERPL CALC-SCNC: 8 MMOL/L (ref 3–11)
AST SERPL-CCNC: 36 U/L (ref 15–37)
BASOPHILS NFR BLD: 0.9 % (ref 0–3)
BILIRUB SERPL-MCNC: 0.5 MG/DL (ref 0–1)
BUN SERPL-MCNC: 24 MG/DL (ref 7–18)
BUN/CREAT SERPL: 19.67 RATIO (ref 7–18)
CALCIUM SERPL-MCNC: 9.2 MG/DL (ref 8.8–10.5)
CHLORIDE SERPL-SCNC: 103 MMOL/L (ref 100–108)
CO2 SERPL-SCNC: 31 MMOL/L (ref 21–32)
CREAT SERPL-MCNC: 1.22 MG/DL (ref 0.55–1.02)
EOSINOPHIL NFR BLD: 1.8 % (ref 1–3)
ERYTHROCYTE [DISTWIDTH] IN BLOOD BY AUTOMATED COUNT: 14 % (ref 12.5–18)
GFR ESTIMATION: 42
GLOBULIN: 3.8 G/DL (ref 2.3–3.5)
GLUCOSE SERPL-MCNC: 110 MG/DL (ref 70–110)
HCT VFR BLD AUTO: 38.8 % (ref 37–47)
HGB BLD-MCNC: 12.6 G/DL (ref 12–16)
LDH SERPL L TO P-CCNC: 253 U/L (ref 82–234)
LYMPHOCYTES NFR BLD: 28.5 % (ref 25–40)
MCH RBC QN AUTO: 32.3 PG (ref 27–31.2)
MCHC RBC AUTO-ENTMCNC: 32.5 G/DL (ref 31.8–35.4)
MCV RBC AUTO: 99.5 FL (ref 80–97)
MONOCYTES NFR BLD: 9.1 % (ref 1–15)
NEUTROPHILS # BLD AUTO: 4.47 10*3/UL (ref 1.8–7.7)
NEUTROPHILS NFR BLD: 59 % (ref 37–80)
NUCLEATED RED BLOOD CELLS: 0 %
PLATELETS: 118 10*3/UL (ref 142–424)
POTASSIUM SERPL-SCNC: 3.7 MMOL/L (ref 3.6–5.2)
PROT SERPL-MCNC: 7.1 G/DL (ref 6.4–8.2)
RBC # BLD AUTO: 3.9 10*6/UL (ref 4.2–5.4)
SODIUM BLD-SCNC: 142 MMOL/L (ref 135–145)
WBC # BLD: 7.6 10*3/UL (ref 4.6–10.2)

## 2023-03-28 ENCOUNTER — OFFICE VISIT (OUTPATIENT)
Dept: HEMATOLOGY/ONCOLOGY | Facility: CLINIC | Age: 83
End: 2023-03-28
Payer: MEDICARE

## 2023-03-28 VITALS
OXYGEN SATURATION: 96 % | WEIGHT: 156 LBS | HEIGHT: 62 IN | HEART RATE: 62 BPM | DIASTOLIC BLOOD PRESSURE: 63 MMHG | TEMPERATURE: 97 F | SYSTOLIC BLOOD PRESSURE: 151 MMHG | BODY MASS INDEX: 28.71 KG/M2 | RESPIRATION RATE: 16 BRPM

## 2023-03-28 DIAGNOSIS — C88.4 EXTRANODAL MARGINAL ZONE B-CELL LYMPHOMA: Primary | ICD-10-CM

## 2023-03-28 DIAGNOSIS — B99.9 INFECTION: ICD-10-CM

## 2023-03-28 PROCEDURE — 99215 OFFICE O/P EST HI 40 MIN: CPT | Mod: GV,S$GLB,, | Performed by: NURSE PRACTITIONER

## 2023-03-28 PROCEDURE — 99215 PR OFFICE/OUTPT VISIT, EST, LEVL V, 40-54 MIN: ICD-10-PCS | Mod: GV,S$GLB,, | Performed by: NURSE PRACTITIONER

## 2023-03-28 RX ORDER — SODIUM CHLORIDE 0.9 % (FLUSH) 0.9 %
10 SYRINGE (ML) INJECTION
Status: CANCELLED | OUTPATIENT
Start: 2023-03-28

## 2023-03-28 RX ORDER — MEPERIDINE HYDROCHLORIDE 50 MG/ML
25 INJECTION INTRAMUSCULAR; INTRAVENOUS; SUBCUTANEOUS
Status: CANCELLED | OUTPATIENT
Start: 2023-03-28

## 2023-03-28 RX ORDER — ACETAMINOPHEN 325 MG/1
650 TABLET ORAL
Status: CANCELLED | OUTPATIENT
Start: 2023-03-28

## 2023-03-28 RX ORDER — FAMOTIDINE 20 MG/1
20 TABLET, FILM COATED ORAL
Status: CANCELLED | OUTPATIENT
Start: 2023-03-28

## 2023-03-28 RX ORDER — HEPARIN 100 UNIT/ML
500 SYRINGE INTRAVENOUS
OUTPATIENT
Start: 2023-03-28

## 2023-03-28 RX ORDER — HEPARIN 100 UNIT/ML
500 SYRINGE INTRAVENOUS
Status: CANCELLED | OUTPATIENT
Start: 2023-03-28

## 2023-03-28 RX ORDER — DIPHENHYDRAMINE HCL 25 MG
50 CAPSULE ORAL
Status: CANCELLED | OUTPATIENT
Start: 2023-03-28

## 2023-03-28 RX ORDER — SODIUM CHLORIDE 0.9 % (FLUSH) 0.9 %
10 SYRINGE (ML) INJECTION
OUTPATIENT
Start: 2023-03-28

## 2023-03-28 NOTE — PROGRESS NOTES
MEDICAL ONCOLOGY FOLLOW UP CONSULTATION NOTE    Patient ID: Daysi Solorzano is a 83 y.o. female.    Chief Complaint: B cell Lymphoma    HPI:  Patient is 82-year-old female with past medical history of COPD, emphysema, bronchitis, depression, gastroesophageal reflux disease, hypertension, congestive heart failure with ejection fraction normalized after biventricular pacing, atelectasis and scarring, degenerative joint disease, hyperlipidemia, sleep apnea, dilated cardiomyopathy, ventricular tachycardia status post ICD, coronary artery disease, chronic back pain, radiculopathy who was referred by Pulmonary after imaging revealed a 1.9 cm left lower lobe nodular density by CT/PET in March of 2019 with SUV 2. Patient underwent repeat PET-CT and left lower lobe CT-guided needle biopsy secondary to enlarging left lower lobe lesion.  Findings were consistent with B-cell lymphoma.  Please see detailed pathology report below.  Patient presents to our clinic today for further evaluation.      Pathology: 06/08/22    Lung nodule, left lower lobe, biopsy:  Extranodal marginal zone B-cell lymphoma (see comment).    Comment:  Sections of the small biopsy show a population of small to medium, round atypical lymphocytes with coarse chromatin and occasional nucleoli, demonstrating moderate cytoplasm and monocyte toyed morphology.  Immunohistochemical stains are performed on block 1A with appropriate controls (in addition to being repeated with flow cytometry) in order to further evaluate the atypical lymphocytes.  The cells of interest demonstrated positive staining for CD 20, [place] 5 and BCL 2 while showing negative straining for CD3 and CD5 (highlights T-cells), BCL6 and CD10 (highlights rare diminutive germinal centers) and cyclin D1.  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    Interpretation:  Taken together these  results demonstrate the presence of clonal population of B lymphocytes.  The immunophenotype in this case in our corrected stick of chronic lymphocytic leukemia/small lymphocytic lymphoma or mantle cell lymphoma.        Imaging:     PET/CT scan: 6/1/22    Chest:   1. There is an area of ground-glass infiltration seen in the left posterior lung.  This measures maximum of approximately 14 mm today versus approximately 8 mm on the previous examination.  The SUV in this area is approximately 1.5.  Given the growth of this lesion this would be suspicious for a possible low-grade malignancy.  2. An ill-defined area of ground-glass infiltrate is seen in the right mid lung field measuring approximately 1.3 cm.  This was not present on the previous examination.  This has an SUV of 1.7  3. A small area of ground-glass infiltrate is seen in the right lower lobe region measuring about 1 cm in diameter this has an SUV in the area of approximately 1.1  4. An area of spiculated parenchymal densities seen in the left lower lobe having a maximum diameter of approximately 2 cm.  This had a diameter of about 1.9 cm on previous examination.  The SUV in this is approximately 3.    Impression:  Multiple areas of parenchymal density in the lungs.  These are either slightly enlarged are new when compared to previous examinations.  Uptake values would be consistent with inflammatory, infectious or low-grade malignancy.      Past Medical History:   Diagnosis Date    Arthritis     COPD (chronic obstructive pulmonary disease)     Hypertension      Family History   Problem Relation Age of Onset    Lung cancer Father     Brain cancer Brother     Breast cancer Paternal Grandmother     Cancer Son      Social History     Socioeconomic History    Marital status:    Tobacco Use    Smoking status: Former     Years: 15.00     Types: Cigarettes    Smokeless tobacco: Never   Substance and Sexual Activity    Alcohol use: Not Currently    Drug use:  Never     Past Surgical History:   Procedure Laterality Date    arm surgery      BACK SURGERY      CHOLECYSTECTOMY      TUBAL LIGATION           Review of systems:  Review of Systems   Constitutional:  Positive for activity change. Negative for appetite change, chills, diaphoresis, fatigue and unexpected weight change.   HENT:  Negative for congestion, facial swelling, hearing loss, mouth sores, trouble swallowing and voice change.    Eyes:  Negative for photophobia, pain, discharge and itching.   Respiratory:  Positive for cough. Negative for apnea, choking, chest tightness and shortness of breath.    Cardiovascular:  Negative for chest pain, palpitations and leg swelling.   Gastrointestinal:  Negative for abdominal distention, abdominal pain, anal bleeding and blood in stool.   Endocrine: Negative for cold intolerance, heat intolerance, polydipsia and polyphagia.   Genitourinary:  Negative for difficulty urinating, dysuria, flank pain and hematuria.   Musculoskeletal:  Negative for arthralgias, back pain, joint swelling, myalgias, neck pain and neck stiffness.   Skin:  Negative for color change, pallor and wound.   Allergic/Immunologic: Positive for environmental allergies. Negative for food allergies and immunocompromised state.   Neurological:  Negative for dizziness, seizures, facial asymmetry, speech difficulty, light-headedness, numbness and headaches.   Hematological:  Negative for adenopathy. Does not bruise/bleed easily.   Psychiatric/Behavioral:  Negative for agitation, behavioral problems, confusion, decreased concentration and sleep disturbance.        Physical Exam  Vitals and nursing note reviewed.   Constitutional:       General: She is not in acute distress.     Appearance: Normal appearance. She is not ill-appearing.   HENT:      Head: Normocephalic and atraumatic.      Nose: No congestion or rhinorrhea.   Eyes:      General: No scleral icterus.     Extraocular Movements: Extraocular movements  intact.      Pupils: Pupils are equal, round, and reactive to light.   Cardiovascular:      Rate and Rhythm: Normal rate and regular rhythm.      Pulses: Normal pulses.      Heart sounds: Normal heart sounds. No murmur heard.    No gallop.   Pulmonary:      Effort: Pulmonary effort is normal. No respiratory distress.      Breath sounds: Normal breath sounds. No stridor. No wheezing or rhonchi.   Abdominal:      General: Bowel sounds are normal. There is no distension.      Palpations: There is no mass.      Tenderness: There is no abdominal tenderness. There is no guarding.   Musculoskeletal:         General: No swelling, tenderness, deformity or signs of injury. Normal range of motion.      Cervical back: Normal range of motion and neck supple. No rigidity. No muscular tenderness.      Right lower leg: No edema.      Left lower leg: No edema.   Skin:     General: Skin is warm.      Coloration: Skin is not jaundiced or pale.      Findings: No bruising or lesion.   Neurological:      General: No focal deficit present.      Mental Status: She is alert and oriented to person, place, and time.      Cranial Nerves: No cranial nerve deficit.      Sensory: No sensory deficit.      Motor: No weakness.      Gait: Gait normal.   Psychiatric:         Mood and Affect: Mood normal.         Behavior: Behavior normal.         Thought Content: Thought content normal.     Vitals:    03/28/23 1111   BP: (!) 151/63   Pulse: 62   Resp: 16   Temp: 97.4 °F (36.3 °C)   Body surface area is 1.76 meters squared.    Assessment/Plan:      ECOG 2    1. 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     == PET scan does not show any findings beyond the diaphragm and I believe with a lesions noted on both sides of the lung we are likely looking at extranodal marginal zone B-cell lymphoma arising  from b/l  lungs.   == 6/20/22:  Hep panel negative. TMB discussion in the interim and also discussion with Radiation Oncology, also taking into account her lung function.  Per Dr. Ren radiation oncology ISRT is not an option considering we are looking at multiple lesions on bilateral lungs.  Also due to her decreased lung function surgical resection is not a possibility.  Consents is made per TMB discussion was to start systemic treatment with Rituximab.  Will obtain port placement and prior authorization request for rituximab  == 6/27/22:  Patient and daughter here today to discuss upcoming chemotherapy, side effect profile, risk versus benefits, and expected outcomes have been discussed today. All questions and concerns answered and consents have been signed. Port placed while in patient, healing well. Plan is to begin treatment on Thursday, labs reviewed, Hepatitis panel negative. LDH WNL, mild anemia noted.   == 7/6/22:  Patient reports being exposed to COVID yesterday at her halfway home.  She reports 3 COVID vaccines but is unsure when her last booster was.  Will delay treatment for 1 week while patient is under quarantine and encouraged patient to notify clinic if she becomes symptomatic.  Plan is to reschedule cycle 2 Rituxan to Wednesday July 13th, okay to use labs from today.  She return to clinic in 2 weeks prior to cycle 3.   == 7/20/22:  Treatment will be delayed today until next Monday, patient was seen by pulmonology Dr. Dixon yesterday and was placed on antibiotic and steroids due to upper respiratory infection.  Infusion has been notified and plan is to complete cycle 3 on July 25, 2022.  No repeat labs needed treatment plan has been signed.  She will return to clinic in 1 week with labs prior to cycle 4 of 4 Rituxan.  == 12/3/23: S/p 4 cycles of Rituximab. PET scan 08/22 showed stable disease with no evidence of progression.  Continue maintenance Rituximab for consolidation--- rituximab Q 3  monthly for 2 years  == 3/28/23: continues with maintenance rituxan. 3/2023 Recent pet shows stable disease. She reports recent URI approximately 2 weeks ago requiring abx and breathing treatments. Will check immunoglobulins.     Port flushes per schedule  Return to clinic in 12  weeks for MD visit with labs for follow-up and rituximab treatment cbc cmp immunoglobulins     Total time spent in counseling and discussion about further management options including relevant lab work, treatment,  prognosis, medications and intended side effects was more than 45 minutes. More than 50% of the time was spent on counseling and coordination of care.  I spent a total of 45 minutes on the day of the visit.This includes face to face time and non-face to face time preparing to see the patient (eg, review of tests), Obtaining and/or reviewing separately obtained history, Documenting clinical information in the electronic or other health record, Independently interpreting resultsand communicating results to the patient/family/caregiver, or Care coordination.

## 2023-05-23 ENCOUNTER — PATIENT MESSAGE (OUTPATIENT)
Dept: RESEARCH | Facility: HOSPITAL | Age: 83
End: 2023-05-23
Payer: MEDICARE

## 2023-06-19 ENCOUNTER — TELEPHONE (OUTPATIENT)
Dept: HEMATOLOGY/ONCOLOGY | Facility: CLINIC | Age: 83
End: 2023-06-19
Payer: MEDICARE

## 2023-06-19 NOTE — TELEPHONE ENCOUNTER
Stated pt is scheduled for labs on June 20 and appt on June 27 at 1:30 pm. Provided address as well.

## 2023-06-19 NOTE — TELEPHONE ENCOUNTER
----- Message from Ying Villarreal sent at 6/19/2023 10:52 AM CDT -----  Contact: pt  Pt calling about pt/scan wanting to know if she will have one done before her appt and wants to know where to go. She needs name and address.  Pt can be reached at [phone]   Thanks,

## 2023-06-20 LAB
ALBUMIN SERPL BCP-MCNC: 3.4 G/DL (ref 3.4–5)
ALBUMIN/GLOBULIN RATIO: 0.83 RATIO (ref 1.1–1.8)
ALP SERPL-CCNC: 88 U/L (ref 46–116)
ALT SERPL W P-5'-P-CCNC: 29 U/L (ref 12–78)
ANION GAP SERPL CALC-SCNC: 7 MMOL/L (ref 3–11)
AST SERPL-CCNC: 31 U/L (ref 15–37)
BASOPHILS NFR BLD: 1.3 % (ref 0–3)
BILIRUB SERPL-MCNC: 0.6 MG/DL (ref 0–1)
BUN SERPL-MCNC: 23 MG/DL (ref 7–18)
BUN/CREAT SERPL: 18.54 RATIO (ref 7–18)
CALCIUM SERPL-MCNC: 9.4 MG/DL (ref 8.8–10.5)
CHLORIDE SERPL-SCNC: 104 MMOL/L (ref 100–108)
CO2 SERPL-SCNC: 32 MMOL/L (ref 21–32)
CREAT SERPL-MCNC: 1.24 MG/DL (ref 0.55–1.02)
EOSINOPHIL NFR BLD: 3.4 % (ref 1–3)
ERYTHROCYTE [DISTWIDTH] IN BLOOD BY AUTOMATED COUNT: 14.7 % (ref 12.5–18)
GFR ESTIMATION: 41
GLOBULIN: 4.1 G/DL (ref 2.3–3.5)
GLUCOSE SERPL-MCNC: 97 MG/DL (ref 70–110)
HCT VFR BLD AUTO: 35.6 % (ref 37–47)
HGB BLD-MCNC: 11.5 G/DL (ref 12–16)
LYMPHOCYTES NFR BLD: 27.2 % (ref 25–40)
MCH RBC QN AUTO: 32.1 PG (ref 27–31.2)
MCHC RBC AUTO-ENTMCNC: 32.3 G/DL (ref 31.8–35.4)
MCV RBC AUTO: 99.4 FL (ref 80–97)
MONOCYTES NFR BLD: 10 % (ref 1–15)
NEUTROPHILS # BLD AUTO: 3.18 10*3/UL (ref 1.8–7.7)
NEUTROPHILS NFR BLD: 57.7 % (ref 37–80)
NUCLEATED RED BLOOD CELLS: 0 %
PLATELETS: 136 10*3/UL (ref 142–424)
POTASSIUM SERPL-SCNC: 3.8 MMOL/L (ref 3.6–5.2)
PROT SERPL-MCNC: 7.5 G/DL (ref 6.4–8.2)
RBC # BLD AUTO: 3.58 10*6/UL (ref 4.2–5.4)
SODIUM BLD-SCNC: 143 MMOL/L (ref 135–145)
WBC # BLD: 5.5 10*3/UL (ref 4.6–10.2)

## 2023-06-23 LAB
IGA SERPL-MCNC: 188 MG/DL (ref 68–408)
IGG SERPL-MCNC: 724 MG/DL (ref 768–1632)
IGM: 763 MG/DL (ref 35–263)

## 2023-06-27 ENCOUNTER — OFFICE VISIT (OUTPATIENT)
Dept: HEMATOLOGY/ONCOLOGY | Facility: CLINIC | Age: 83
End: 2023-06-27
Payer: MEDICARE

## 2023-06-27 VITALS
HEART RATE: 72 BPM | OXYGEN SATURATION: 89 % | BODY MASS INDEX: 28.34 KG/M2 | DIASTOLIC BLOOD PRESSURE: 60 MMHG | HEIGHT: 62 IN | SYSTOLIC BLOOD PRESSURE: 109 MMHG | WEIGHT: 154 LBS | RESPIRATION RATE: 18 BRPM

## 2023-06-27 DIAGNOSIS — C88.4 EXTRANODAL MARGINAL ZONE B-CELL LYMPHOMA: Primary | ICD-10-CM

## 2023-06-27 PROCEDURE — 99214 PR OFFICE/OUTPT VISIT, EST, LEVL IV, 30-39 MIN: ICD-10-PCS | Mod: GV,S$GLB,ICN, | Performed by: NURSE PRACTITIONER

## 2023-06-27 PROCEDURE — 99214 OFFICE O/P EST MOD 30 MIN: CPT | Mod: GV,S$GLB,ICN, | Performed by: NURSE PRACTITIONER

## 2023-06-27 RX ORDER — DIPHENHYDRAMINE HCL 25 MG
50 CAPSULE ORAL
Status: CANCELLED | OUTPATIENT
Start: 2023-06-27

## 2023-06-27 RX ORDER — ACETAMINOPHEN 325 MG/1
650 TABLET ORAL
Status: CANCELLED | OUTPATIENT
Start: 2023-06-27

## 2023-06-27 RX ORDER — HEPARIN 100 UNIT/ML
500 SYRINGE INTRAVENOUS
Status: CANCELLED | OUTPATIENT
Start: 2023-06-27

## 2023-06-27 RX ORDER — SODIUM CHLORIDE 0.9 % (FLUSH) 0.9 %
10 SYRINGE (ML) INJECTION
Status: CANCELLED | OUTPATIENT
Start: 2023-06-27

## 2023-06-27 RX ORDER — MEPERIDINE HYDROCHLORIDE 50 MG/ML
25 INJECTION INTRAMUSCULAR; INTRAVENOUS; SUBCUTANEOUS
Status: CANCELLED | OUTPATIENT
Start: 2023-06-27

## 2023-06-27 RX ORDER — FAMOTIDINE 20 MG/1
20 TABLET, FILM COATED ORAL
Status: CANCELLED | OUTPATIENT
Start: 2023-06-27

## 2023-06-27 NOTE — PROGRESS NOTES
MEDICAL ONCOLOGY FOLLOW UP CONSULTATION NOTE    Patient ID: Daysi Solorzano is a 83 y.o. female.    Chief Complaint: B cell Lymphoma    HPI:  Patient is 82-year-old female with past medical history of COPD, emphysema, bronchitis, depression, gastroesophageal reflux disease, hypertension, congestive heart failure with ejection fraction normalized after biventricular pacing, atelectasis and scarring, degenerative joint disease, hyperlipidemia, sleep apnea, dilated cardiomyopathy, ventricular tachycardia status post ICD, coronary artery disease, chronic back pain, radiculopathy who was referred by Pulmonary after imaging revealed a 1.9 cm left lower lobe nodular density by CT/PET in March of 2019 with SUV 2. Patient underwent repeat PET-CT and left lower lobe CT-guided needle biopsy secondary to enlarging left lower lobe lesion.  Findings were consistent with B-cell lymphoma.  Please see detailed pathology report below.  Patient presents to our clinic today for further evaluation.      Pathology: 06/08/22    Lung nodule, left lower lobe, biopsy:  Extranodal marginal zone B-cell lymphoma (see comment).    Comment:  Sections of the small biopsy show a population of small to medium, round atypical lymphocytes with coarse chromatin and occasional nucleoli, demonstrating moderate cytoplasm and monocyte toyed morphology.  Immunohistochemical stains are performed on block 1A with appropriate controls (in addition to being repeated with flow cytometry) in order to further evaluate the atypical lymphocytes.  The cells of interest demonstrated positive staining for CD 20, [place] 5 and BCL 2 while showing negative straining for CD3 and CD5 (highlights T-cells), BCL6 and CD10 (highlights rare diminutive germinal centers) and cyclin D1.  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    Interpretation:  Taken together these  results demonstrate the presence of clonal population of B lymphocytes.  The immunophenotype in this case in our corrected stick of chronic lymphocytic leukemia/small lymphocytic lymphoma or mantle cell lymphoma.        Imaging:     PET/CT scan: 6/1/22    Chest:   1. There is an area of ground-glass infiltration seen in the left posterior lung.  This measures maximum of approximately 14 mm today versus approximately 8 mm on the previous examination.  The SUV in this area is approximately 1.5.  Given the growth of this lesion this would be suspicious for a possible low-grade malignancy.  2. An ill-defined area of ground-glass infiltrate is seen in the right mid lung field measuring approximately 1.3 cm.  This was not present on the previous examination.  This has an SUV of 1.7  3. A small area of ground-glass infiltrate is seen in the right lower lobe region measuring about 1 cm in diameter this has an SUV in the area of approximately 1.1  4. An area of spiculated parenchymal densities seen in the left lower lobe having a maximum diameter of approximately 2 cm.  This had a diameter of about 1.9 cm on previous examination.  The SUV in this is approximately 3.    Impression:  Multiple areas of parenchymal density in the lungs.  These are either slightly enlarged are new when compared to previous examinations.  Uptake values would be consistent with inflammatory, infectious or low-grade malignancy.      Past Medical History:   Diagnosis Date    Arthritis     COPD (chronic obstructive pulmonary disease)     Hypertension      Family History   Problem Relation Age of Onset    Lung cancer Father     Brain cancer Brother     Breast cancer Paternal Grandmother     Cancer Son      Social History     Socioeconomic History    Marital status:    Tobacco Use    Smoking status: Former     Years: 15.00     Types: Cigarettes    Smokeless tobacco: Never   Substance and Sexual Activity    Alcohol use: Not Currently    Drug use:  Never     Past Surgical History:   Procedure Laterality Date    arm surgery      BACK SURGERY      CHOLECYSTECTOMY      TUBAL LIGATION           Review of systems:  Review of Systems   Constitutional:  Positive for activity change. Negative for appetite change, chills, diaphoresis, fatigue and unexpected weight change.   HENT:  Negative for congestion, facial swelling, hearing loss, mouth sores, trouble swallowing and voice change.    Eyes:  Negative for photophobia, pain, discharge and itching.   Respiratory:  Positive for cough. Negative for apnea, choking, chest tightness and shortness of breath.    Cardiovascular:  Negative for chest pain, palpitations and leg swelling.   Gastrointestinal:  Negative for abdominal distention, abdominal pain, anal bleeding and blood in stool.   Endocrine: Negative for cold intolerance, heat intolerance, polydipsia and polyphagia.   Genitourinary:  Negative for difficulty urinating, dysuria, flank pain and hematuria.   Musculoskeletal:  Negative for arthralgias, back pain, joint swelling, myalgias, neck pain and neck stiffness.   Skin:  Negative for color change, pallor and wound.   Allergic/Immunologic: Positive for environmental allergies. Negative for food allergies and immunocompromised state.   Neurological:  Negative for dizziness, seizures, facial asymmetry, speech difficulty, light-headedness, numbness and headaches.   Hematological:  Negative for adenopathy. Does not bruise/bleed easily.   Psychiatric/Behavioral:  Negative for agitation, behavioral problems, confusion, decreased concentration and sleep disturbance.        Physical Exam  Vitals and nursing note reviewed.   Constitutional:       General: She is not in acute distress.     Appearance: Normal appearance. She is not ill-appearing.   HENT:      Head: Normocephalic and atraumatic.      Nose: No congestion or rhinorrhea.   Eyes:      General: No scleral icterus.     Extraocular Movements: Extraocular movements  intact.      Pupils: Pupils are equal, round, and reactive to light.   Cardiovascular:      Rate and Rhythm: Normal rate and regular rhythm.      Pulses: Normal pulses.      Heart sounds: Normal heart sounds. No murmur heard.    No gallop.   Pulmonary:      Effort: Pulmonary effort is normal. No respiratory distress.      Breath sounds: Normal breath sounds. No stridor. No wheezing or rhonchi.   Abdominal:      General: Bowel sounds are normal. There is no distension.      Palpations: There is no mass.      Tenderness: There is no abdominal tenderness. There is no guarding.   Musculoskeletal:         General: No swelling, tenderness, deformity or signs of injury. Normal range of motion.      Cervical back: Normal range of motion and neck supple. No rigidity. No muscular tenderness.      Right lower leg: No edema.      Left lower leg: No edema.   Skin:     General: Skin is warm.      Coloration: Skin is not jaundiced or pale.      Findings: No bruising or lesion.   Neurological:      General: No focal deficit present.      Mental Status: She is alert and oriented to person, place, and time.      Cranial Nerves: No cranial nerve deficit.      Sensory: No sensory deficit.      Motor: No weakness.      Gait: Gait normal.   Psychiatric:         Mood and Affect: Mood normal.         Behavior: Behavior normal.         Thought Content: Thought content normal.     Vitals:    06/27/23 1054   BP: 109/60   Pulse: 72   Resp: 18   Body surface area is 1.75 meters squared.    Assessment/Plan:      ECOG 2    1. 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     == PET scan does not show any findings beyond the diaphragm and I believe with a lesions noted on both sides of the lung we are likely looking at extranodal marginal zone B-cell lymphoma arising from b/l  lungs.   ==  6/20/22:  Hep panel negative. TMB discussion in the interim and also discussion with Radiation Oncology, also taking into account her lung function.  Per Dr. Ren radiation oncology ISRT is not an option considering we are looking at multiple lesions on bilateral lungs.  Also due to her decreased lung function surgical resection is not a possibility.  Consents is made per TMB discussion was to start systemic treatment with Rituximab.  Will obtain port placement and prior authorization request for rituximab  == 6/27/22:  Patient and daughter here today to discuss upcoming chemotherapy, side effect profile, risk versus benefits, and expected outcomes have been discussed today. All questions and concerns answered and consents have been signed. Port placed while in patient, healing well. Plan is to begin treatment on Thursday, labs reviewed, Hepatitis panel negative. LDH WNL, mild anemia noted.   == 7/6/22:  Patient reports being exposed to COVID yesterday at her [facility] home.  She reports 3 COVID vaccines but is unsure when her last booster was.  Will delay treatment for 1 week while patient is under quarantine and encouraged patient to notify clinic if she becomes symptomatic.  Plan is to reschedule cycle 2 Rituxan to Wednesday July 13th, okay to use labs from today.  She return to clinic in 2 weeks prior to cycle 3.   == 7/20/22:  Treatment will be delayed today until next Monday, patient was seen by pulmonology Dr. Dixon yesterday and was placed on antibiotic and steroids due to upper respiratory infection.  Infusion has been notified and plan is to complete cycle 3 on July 25, 2022.  No repeat labs needed treatment plan has been signed.  She will return to clinic in 1 week with labs prior to cycle 4 of 4 Rituxan.  == 12/3/23: S/p 4 cycles of Rituximab. PET scan 08/22 showed stable disease with no evidence of progression.  Continue maintenance Rituximab for consolidation--- rituximab Q 3 monthly for 2  years  == 3/28/23: continues with maintenance rituxan. 3/2023 Recent pet shows stable disease. She reports recent URI approximately 2 weeks ago requiring abx and breathing treatments. Will check immunoglobulins.   == 6/27/23: doing well, currently enrolled with [facility] by PCP, labs reviewed will proceed with rituxan as planned for today.     Port flushes per schedule  Return to clinic in 12  weeks for MD visit with labs for follow-up and rituximab treatment cbc cmp LDH  Repeat PET ordered    Total time spent in counseling and discussion about further management options including relevant lab work, treatment,  prognosis, medications and intended side effects was more than 25 minutes. More than 50% of the time was spent on counseling and coordination of care.  I spent a total of 25 minutes on the day of the visit.This includes face to face time and non-face to face time preparing to see the patient (eg, review of tests), Obtaining and/or reviewing separately obtained history, Documenting clinical information in the electronic or other health record, Independently interpreting resultsand communicating results to the patient/family/caregiver, or Care coordination.

## 2023-06-29 ENCOUNTER — TELEPHONE (OUTPATIENT)
Dept: HEMATOLOGY/ONCOLOGY | Facility: CLINIC | Age: 83
End: 2023-06-29
Payer: MEDICARE

## 2023-06-29 NOTE — TELEPHONE ENCOUNTER
Patient called for an order to remove her port. She was instructed that this was something that could be discussed with Dr Ring at her next appt after he has reviewed her pet.

## 2023-09-22 DIAGNOSIS — C88.4 EXTRANODAL MARGINAL ZONE B-CELL LYMPHOMA: Primary | ICD-10-CM

## 2023-09-25 LAB
ALBUMIN SERPL BCP-MCNC: 3.3 G/DL (ref 3.4–5)
ALBUMIN/GLOBULIN RATIO: 0.87 RATIO (ref 1.1–1.8)
ALP SERPL-CCNC: 76 U/L (ref 46–116)
ALT SERPL W P-5'-P-CCNC: 29 U/L (ref 12–78)
ANION GAP SERPL CALC-SCNC: 10 MMOL/L (ref 3–11)
AST SERPL-CCNC: 27 U/L (ref 15–37)
BASOPHILS NFR BLD: 0.6 % (ref 0–3)
BILIRUB SERPL-MCNC: 0.4 MG/DL (ref 0–1)
BUN SERPL-MCNC: 13 MG/DL (ref 7–18)
BUN/CREAT SERPL: 12.87 RATIO (ref 7–18)
CALCIUM SERPL-MCNC: 9.4 MG/DL (ref 8.8–10.5)
CHLORIDE SERPL-SCNC: 107 MMOL/L (ref 100–108)
CO2 SERPL-SCNC: 25 MMOL/L (ref 21–32)
CREAT SERPL-MCNC: 1.01 MG/DL (ref 0.55–1.02)
EOSINOPHIL NFR BLD: 1.4 % (ref 1–3)
ERYTHROCYTE [DISTWIDTH] IN BLOOD BY AUTOMATED COUNT: 15 % (ref 12.5–18)
GFR ESTIMATION: 52
GLOBULIN: 3.8 G/DL (ref 2.3–3.5)
GLUCOSE SERPL-MCNC: 96 MG/DL (ref 70–110)
HCT VFR BLD AUTO: 35 % (ref 37–47)
HGB BLD-MCNC: 11.3 G/DL (ref 12–16)
LDH SERPL L TO P-CCNC: 211 U/L (ref 82–234)
LYMPHOCYTES NFR BLD: 23.6 % (ref 25–40)
MACROCYTES BLD QL SMEAR: NORMAL
MCH RBC QN AUTO: 32.7 PG (ref 27–31.2)
MCHC RBC AUTO-ENTMCNC: 32.3 G/DL (ref 31.8–35.4)
MCV RBC AUTO: 101.2 FL (ref 80–97)
MONOCYTES NFR BLD: 6.8 % (ref 1–15)
NEUTROPHILS # BLD AUTO: 5.28 10*3/UL (ref 1.8–7.7)
NEUTROPHILS NFR BLD: 67.2 % (ref 37–80)
NUCLEATED RED BLOOD CELLS: 0 %
PLATELETS: 138 10*3/UL (ref 142–424)
POTASSIUM SERPL-SCNC: 4.1 MMOL/L (ref 3.6–5.2)
PROT SERPL-MCNC: 7.1 G/DL (ref 6.4–8.2)
RBC # BLD AUTO: 3.46 10*6/UL (ref 4.2–5.4)
SODIUM BLD-SCNC: 142 MMOL/L (ref 135–145)
WBC # BLD: 7.9 10*3/UL (ref 4.6–10.2)

## 2023-10-09 DIAGNOSIS — D63.0 ANEMIA IN NEOPLASTIC DISEASE: Primary | ICD-10-CM

## 2023-10-09 LAB
ALBUMIN SERPL BCP-MCNC: 3.4 G/DL (ref 3.4–5)
ALBUMIN/GLOBULIN RATIO: 0.89 RATIO (ref 1.1–1.8)
ALP SERPL-CCNC: 81 U/L (ref 46–116)
ALT SERPL W P-5'-P-CCNC: 20 U/L (ref 12–78)
ANION GAP SERPL CALC-SCNC: 7 MMOL/L (ref 3–11)
AST SERPL-CCNC: 22 U/L (ref 15–37)
BASOPHILS NFR BLD: 0.7 % (ref 0–3)
BILIRUB SERPL-MCNC: 0.5 MG/DL (ref 0–1)
BUN SERPL-MCNC: 9 MG/DL (ref 7–18)
BUN/CREAT SERPL: 7.69 RATIO (ref 7–18)
CALCIUM SERPL-MCNC: 9.7 MG/DL (ref 8.8–10.5)
CHLORIDE SERPL-SCNC: 105 MMOL/L (ref 100–108)
CO2 SERPL-SCNC: 28 MMOL/L (ref 21–32)
CREAT SERPL-MCNC: 1.17 MG/DL (ref 0.55–1.02)
EOSINOPHIL NFR BLD: 1.8 % (ref 1–3)
ERYTHROCYTE [DISTWIDTH] IN BLOOD BY AUTOMATED COUNT: 15 % (ref 12.5–18)
GFR ESTIMATION: 44
GLOBULIN: 3.8 G/DL (ref 2.3–3.5)
GLUCOSE SERPL-MCNC: 146 MG/DL (ref 70–110)
HCT VFR BLD AUTO: 33.6 % (ref 37–47)
HGB BLD-MCNC: 11 G/DL (ref 12–16)
LDH SERPL L TO P-CCNC: 211 U/L (ref 82–234)
LYMPHOCYTES NFR BLD: 27.3 % (ref 25–40)
MCH RBC QN AUTO: 33.3 PG (ref 27–31.2)
MCHC RBC AUTO-ENTMCNC: 32.7 G/DL (ref 31.8–35.4)
MCV RBC AUTO: 101.8 FL (ref 80–97)
MONOCYTES NFR BLD: 8.6 % (ref 1–15)
NEUTROPHILS # BLD AUTO: 4.7 10*3/UL (ref 1.8–7.7)
NEUTROPHILS NFR BLD: 61.3 % (ref 37–80)
NUCLEATED RED BLOOD CELLS: 0 %
PLATELETS: 174 10*3/UL (ref 142–424)
POTASSIUM SERPL-SCNC: 3.3 MMOL/L (ref 3.6–5.2)
PROT SERPL-MCNC: 7.2 G/DL (ref 6.4–8.2)
RBC # BLD AUTO: 3.3 10*6/UL (ref 4.2–5.4)
RBC MORPH BLD: NORMAL
SODIUM BLD-SCNC: 140 MMOL/L (ref 135–145)
WBC # BLD: 7.7 10*3/UL (ref 4.6–10.2)

## 2023-10-10 ENCOUNTER — OFFICE VISIT (OUTPATIENT)
Dept: HEMATOLOGY/ONCOLOGY | Facility: CLINIC | Age: 83
End: 2023-10-10
Payer: MEDICARE

## 2023-10-10 VITALS
RESPIRATION RATE: 18 BRPM | HEART RATE: 60 BPM | HEIGHT: 62 IN | DIASTOLIC BLOOD PRESSURE: 80 MMHG | WEIGHT: 153 LBS | SYSTOLIC BLOOD PRESSURE: 134 MMHG | BODY MASS INDEX: 28.16 KG/M2 | OXYGEN SATURATION: 90 %

## 2023-10-10 DIAGNOSIS — N18.31 CHRONIC KIDNEY DISEASE, STAGE 3A: ICD-10-CM

## 2023-10-10 DIAGNOSIS — C88.4 EXTRANODAL MARGINAL ZONE B-CELL LYMPHOMA: Primary | ICD-10-CM

## 2023-10-10 DIAGNOSIS — E11.40 TYPE 2 DIABETES MELLITUS WITH DIABETIC NEUROPATHY, UNSPECIFIED WHETHER LONG TERM INSULIN USE: ICD-10-CM

## 2023-10-10 DIAGNOSIS — K74.60 HEPATIC CIRRHOSIS, UNSPECIFIED HEPATIC CIRRHOSIS TYPE, UNSPECIFIED WHETHER ASCITES PRESENT: ICD-10-CM

## 2023-10-10 DIAGNOSIS — F33.42 RECURRENT MAJOR DEPRESSIVE DISORDER, IN FULL REMISSION: ICD-10-CM

## 2023-10-10 PROCEDURE — 99215 PR OFFICE/OUTPT VISIT, EST, LEVL V, 40-54 MIN: ICD-10-PCS | Mod: GV,S$GLB,ICN, | Performed by: INTERNAL MEDICINE

## 2023-10-10 PROCEDURE — 99215 OFFICE O/P EST HI 40 MIN: CPT | Mod: GV,S$GLB,ICN, | Performed by: INTERNAL MEDICINE

## 2023-10-10 RX ORDER — DIPHENHYDRAMINE HCL 25 MG
50 CAPSULE ORAL
Status: CANCELLED | OUTPATIENT
Start: 2023-10-10

## 2023-10-10 RX ORDER — HEPARIN 100 UNIT/ML
500 SYRINGE INTRAVENOUS
Status: CANCELLED | OUTPATIENT
Start: 2023-10-10

## 2023-10-10 RX ORDER — ACETAMINOPHEN 325 MG/1
650 TABLET ORAL
Status: CANCELLED | OUTPATIENT
Start: 2023-10-10

## 2023-10-10 RX ORDER — MEPERIDINE HYDROCHLORIDE 50 MG/ML
25 INJECTION INTRAMUSCULAR; INTRAVENOUS; SUBCUTANEOUS
Status: CANCELLED | OUTPATIENT
Start: 2023-10-10

## 2023-10-10 RX ORDER — FAMOTIDINE 20 MG/1
20 TABLET, FILM COATED ORAL
Status: CANCELLED | OUTPATIENT
Start: 2023-10-10

## 2023-10-10 RX ORDER — SODIUM CHLORIDE 0.9 % (FLUSH) 0.9 %
10 SYRINGE (ML) INJECTION
Status: CANCELLED | OUTPATIENT
Start: 2023-10-10

## 2023-10-10 NOTE — PROGRESS NOTES
MEDICAL ONCOLOGY FOLLOW UP CONSULTATION NOTE    Patient ID: Daysi Solorzano is a 83 y.o. female.    Chief Complaint: B cell Lymphoma    HPI:  Patient is 82-year-old female with past medical history of COPD, emphysema, bronchitis, depression, gastroesophageal reflux disease, hypertension, congestive heart failure with ejection fraction normalized after biventricular pacing, atelectasis and scarring, degenerative joint disease, hyperlipidemia, sleep apnea, dilated cardiomyopathy, ventricular tachycardia status post ICD, coronary artery disease, chronic back pain, radiculopathy who was referred by Pulmonary after imaging revealed a 1.9 cm left lower lobe nodular density by CT/PET in March of 2019 with SUV 2. Patient underwent repeat PET-CT and left lower lobe CT-guided needle biopsy secondary to enlarging left lower lobe lesion.  Findings were consistent with B-cell lymphoma.  Please see detailed pathology report below.  Patient presents to our clinic today for further evaluation.      Pathology: 06/08/22    Lung nodule, left lower lobe, biopsy:  Extranodal marginal zone B-cell lymphoma (see comment).    Comment:  Sections of the small biopsy show a population of small to medium, round atypical lymphocytes with coarse chromatin and occasional nucleoli, demonstrating moderate cytoplasm and monocyte toyed morphology.  Immunohistochemical stains are performed on block 1A with appropriate controls (in addition to being repeated with flow cytometry) in order to further evaluate the atypical lymphocytes.  The cells of interest demonstrated positive staining for CD 20, [place] 5 and BCL 2 while showing negative straining for CD3 and CD5 (highlights T-cells), BCL6 and CD10 (highlights rare diminutive germinal centers) and cyclin D1.  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    Interpretation:  Taken together these  results demonstrate the presence of clonal population of B lymphocytes.  The immunophenotype in this case in our corrected stick of chronic lymphocytic leukemia/small lymphocytic lymphoma or mantle cell lymphoma.        Imaging:     PET/CT scan: 6/1/22    Chest:   1. There is an area of ground-glass infiltration seen in the left posterior lung.  This measures maximum of approximately 14 mm today versus approximately 8 mm on the previous examination.  The SUV in this area is approximately 1.5.  Given the growth of this lesion this would be suspicious for a possible low-grade malignancy.  2. An ill-defined area of ground-glass infiltrate is seen in the right mid lung field measuring approximately 1.3 cm.  This was not present on the previous examination.  This has an SUV of 1.7  3. A small area of ground-glass infiltrate is seen in the right lower lobe region measuring about 1 cm in diameter this has an SUV in the area of approximately 1.1  4. An area of spiculated parenchymal densities seen in the left lower lobe having a maximum diameter of approximately 2 cm.  This had a diameter of about 1.9 cm on previous examination.  The SUV in this is approximately 3.    Impression:  Multiple areas of parenchymal density in the lungs.  These are either slightly enlarged are new when compared to previous examinations.  Uptake values would be consistent with inflammatory, infectious or low-grade malignancy.      Past Medical History:   Diagnosis Date    Arthritis     COPD (chronic obstructive pulmonary disease)     Hypertension      Family History   Problem Relation Age of Onset    Lung cancer Father     Brain cancer Brother     Breast cancer Paternal Grandmother     Cancer Son      Social History     Socioeconomic History    Marital status:    Tobacco Use    Smoking status: Former     Types: Cigarettes    Smokeless tobacco: Never   Substance and Sexual Activity    Alcohol use: Not Currently    Drug use: Never     Past  Surgical History:   Procedure Laterality Date    arm surgery      BACK SURGERY      CHOLECYSTECTOMY      TUBAL LIGATION           Review of systems:  Review of Systems   Constitutional:  Positive for activity change. Negative for appetite change, chills, diaphoresis, fatigue and unexpected weight change.   HENT:  Negative for congestion, facial swelling, hearing loss, mouth sores, trouble swallowing and voice change.    Eyes:  Negative for photophobia, pain, discharge and itching.   Respiratory:  Positive for cough. Negative for apnea, choking, chest tightness and shortness of breath.    Cardiovascular:  Negative for chest pain, palpitations and leg swelling.   Gastrointestinal:  Negative for abdominal distention, abdominal pain, anal bleeding and blood in stool.   Endocrine: Negative for cold intolerance, heat intolerance, polydipsia and polyphagia.   Genitourinary:  Negative for difficulty urinating, dysuria, flank pain and hematuria.   Musculoskeletal:  Negative for arthralgias, back pain, joint swelling, myalgias, neck pain and neck stiffness.   Skin:  Negative for color change, pallor and wound.   Allergic/Immunologic: Positive for environmental allergies. Negative for food allergies and immunocompromised state.   Neurological:  Negative for dizziness, seizures, facial asymmetry, speech difficulty, light-headedness, numbness and headaches.   Hematological:  Negative for adenopathy. Does not bruise/bleed easily.   Psychiatric/Behavioral:  Negative for agitation, behavioral problems, confusion, decreased concentration and sleep disturbance.          Physical Exam  Vitals and nursing note reviewed.   Constitutional:       General: She is not in acute distress.     Appearance: Normal appearance. She is not ill-appearing.   HENT:      Head: Normocephalic and atraumatic.      Nose: No congestion or rhinorrhea.   Eyes:      General: No scleral icterus.     Extraocular Movements: Extraocular movements intact.       Pupils: Pupils are equal, round, and reactive to light.   Cardiovascular:      Rate and Rhythm: Normal rate and regular rhythm.      Pulses: Normal pulses.      Heart sounds: Normal heart sounds. No murmur heard.     No gallop.   Pulmonary:      Effort: Pulmonary effort is normal. No respiratory distress.      Breath sounds: Normal breath sounds. No stridor. No wheezing or rhonchi.   Abdominal:      General: Bowel sounds are normal. There is no distension.      Palpations: There is no mass.      Tenderness: There is no abdominal tenderness. There is no guarding.   Musculoskeletal:         General: No swelling, tenderness, deformity or signs of injury. Normal range of motion.      Cervical back: Normal range of motion and neck supple. No rigidity. No muscular tenderness.      Right lower leg: No edema.      Left lower leg: No edema.   Skin:     General: Skin is warm.      Coloration: Skin is not jaundiced or pale.      Findings: No bruising or lesion.   Neurological:      General: No focal deficit present.      Mental Status: She is alert and oriented to person, place, and time.      Cranial Nerves: No cranial nerve deficit.      Sensory: No sensory deficit.      Motor: No weakness.      Gait: Gait normal.   Psychiatric:         Mood and Affect: Mood normal.         Behavior: Behavior normal.         Thought Content: Thought content normal.       There were no vitals filed for this visit.  There is no height or weight on file to calculate BSA.    Assessment/Plan:      ECOG 2    1. 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     == PET scan does not show any findings beyond the diaphragm and I believe with a lesions noted on both sides of the lung we are likely looking at extranodal marginal zone B-cell lymphoma arising from b/l  lungs.   == 6/20/22:  Hep panel  negative. TMB discussion in the interim and also discussion with Radiation Oncology, also taking into account her lung function.  Per Dr. Ren radiation oncology ISRT is not an option considering we are looking at multiple lesions on bilateral lungs.  Also due to her decreased lung function surgical resection is not a possibility.  Consents is made per TMB discussion was to start systemic treatment with Rituximab.  Will obtain port placement and prior authorization request for rituximab  == 6/27/22:  Patient and daughter here today to discuss upcoming chemotherapy, side effect profile, risk versus benefits, and expected outcomes have been discussed today. All questions and concerns answered and consents have been signed. Port placed while in patient, healing well. Plan is to begin treatment on Thursday, labs reviewed, Hepatitis panel negative. LDH WNL, mild anemia noted.   == 7/6/22:  Patient reports being exposed to COVID yesterday at her residential home.  She reports 3 COVID vaccines but is unsure when her last booster was.  Will delay treatment for 1 week while patient is under quarantine and encouraged patient to notify clinic if she becomes symptomatic.  Plan is to reschedule cycle 2 Rituxan to Wednesday July 13th, okay to use labs from today.  She return to clinic in 2 weeks prior to cycle 3.   == 7/20/22:  Treatment will be delayed today until next Monday, patient was seen by pulmonology Dr. Dixon yesterday and was placed on antibiotic and steroids due to upper respiratory infection.  Infusion has been notified and plan is to complete cycle 3 on July 25, 2022.  No repeat labs needed treatment plan has been signed.  She will return to clinic in 1 week with labs prior to cycle 4 of 4 Rituxan.  == 12/3/22: S/p 4 cycles of Rituximab. PET scan 08/22 showed stable disease with no evidence of progression.  Continue maintenance Rituximab for consolidation--- rituximab Q 3 monthly for 2 years  == 3/28/23: continues  with maintenance rituxan. 3/2023 Recent pet shows stable disease. She reports recent URI approximately 2 weeks ago requiring abx and breathing treatments. Will check immunoglobulins.   == 6/27/23: Doing well, currently enrolled with [facility] by PCP, labs reviewed will proceed with rituxan as planned for today.   ==10/10/23: PET scan showed no evidence of lymphoma recurrence. Continue maintenance rituximab q 3 monthly for total 2 years    Port flushes per schedule  Return to clinic in 12  weeks for MD visit with labs for follow-up and rituximab treatment cbc cmp LDH      Total time spent in counseling and discussion about further management options including relevant lab work, treatment,  prognosis, medications and intended side effects was more than 60 minutes. More than 50% of the time was spent on counseling and coordination of care.  I spent a total of 60 minutes on the day of the visit.This includes face to face time and non-face to face time preparing to see the patient (eg, review of tests), Obtaining and/or reviewing separately obtained history, Documenting clinical information in the electronic or other health record, Independently interpreting resultsand communicating results to the patient/family/caregiver, or Care coordination.

## 2023-10-16 ENCOUNTER — OFFICE VISIT (OUTPATIENT)
Dept: HEMATOLOGY/ONCOLOGY | Facility: CLINIC | Age: 83
End: 2023-10-16
Payer: MEDICARE

## 2023-10-16 VITALS
OXYGEN SATURATION: 91 % | RESPIRATION RATE: 16 BRPM | WEIGHT: 152.81 LBS | SYSTOLIC BLOOD PRESSURE: 118 MMHG | HEART RATE: 92 BPM | BODY MASS INDEX: 28.12 KG/M2 | DIASTOLIC BLOOD PRESSURE: 67 MMHG | HEIGHT: 62 IN

## 2023-10-16 DIAGNOSIS — C88.4 EXTRANODAL MARGINAL ZONE B-CELL LYMPHOMA: Primary | ICD-10-CM

## 2023-10-16 PROCEDURE — 99214 OFFICE O/P EST MOD 30 MIN: CPT | Mod: GV,S$GLB,ICN, | Performed by: NURSE PRACTITIONER

## 2023-10-16 PROCEDURE — 99214 PR OFFICE/OUTPT VISIT, EST, LEVL IV, 30-39 MIN: ICD-10-PCS | Mod: GV,S$GLB,ICN, | Performed by: NURSE PRACTITIONER

## 2023-10-16 NOTE — PROGRESS NOTES
MEDICAL ONCOLOGY FOLLOW UP CONSULTATION NOTE    Patient ID: Daysi Solorzano is a 83 y.o. female.    Chief Complaint: B cell Lymphoma    HPI:  Patient is 82-year-old female with past medical history of COPD, emphysema, bronchitis, depression, gastroesophageal reflux disease, hypertension, congestive heart failure with ejection fraction normalized after biventricular pacing, atelectasis and scarring, degenerative joint disease, hyperlipidemia, sleep apnea, dilated cardiomyopathy, ventricular tachycardia status post ICD, coronary artery disease, chronic back pain, radiculopathy who was referred by Pulmonary after imaging revealed a 1.9 cm left lower lobe nodular density by CT/PET in March of 2019 with SUV 2. Patient underwent repeat PET-CT and left lower lobe CT-guided needle biopsy secondary to enlarging left lower lobe lesion.  Findings were consistent with B-cell lymphoma.  Please see detailed pathology report below.  Patient presents to our clinic today for further evaluation.      Pathology: 06/08/22    Lung nodule, left lower lobe, biopsy:  Extranodal marginal zone B-cell lymphoma (see comment).    Comment:  Sections of the small biopsy show a population of small to medium, round atypical lymphocytes with coarse chromatin and occasional nucleoli, demonstrating moderate cytoplasm and monocyte toyed morphology.  Immunohistochemical stains are performed on block 1A with appropriate controls (in addition to being repeated with flow cytometry) in order to further evaluate the atypical lymphocytes.  The cells of interest demonstrated positive staining for CD 20, [place] 5 and BCL 2 while showing negative straining for CD3 and CD5 (highlights T-cells), BCL6 and CD10 (highlights rare diminutive germinal centers) and cyclin D1.  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    Interpretation:  Taken together these  results demonstrate the presence of clonal population of B lymphocytes.  The immunophenotype in this case in our corrected stick of chronic lymphocytic leukemia/small lymphocytic lymphoma or mantle cell lymphoma.        Imaging:     PET/CT scan: 6/1/22    Chest:   1. There is an area of ground-glass infiltration seen in the left posterior lung.  This measures maximum of approximately 14 mm today versus approximately 8 mm on the previous examination.  The SUV in this area is approximately 1.5.  Given the growth of this lesion this would be suspicious for a possible low-grade malignancy.  2. An ill-defined area of ground-glass infiltrate is seen in the right mid lung field measuring approximately 1.3 cm.  This was not present on the previous examination.  This has an SUV of 1.7  3. A small area of ground-glass infiltrate is seen in the right lower lobe region measuring about 1 cm in diameter this has an SUV in the area of approximately 1.1  4. An area of spiculated parenchymal densities seen in the left lower lobe having a maximum diameter of approximately 2 cm.  This had a diameter of about 1.9 cm on previous examination.  The SUV in this is approximately 3.    Impression:  Multiple areas of parenchymal density in the lungs.  These are either slightly enlarged are new when compared to previous examinations.  Uptake values would be consistent with inflammatory, infectious or low-grade malignancy.      Past Medical History:   Diagnosis Date    Arthritis     COPD (chronic obstructive pulmonary disease)     Hypertension      Family History   Problem Relation Age of Onset    Lung cancer Father     Brain cancer Brother     Breast cancer Paternal Grandmother     Cancer Son      Social History     Socioeconomic History    Marital status:    Tobacco Use    Smoking status: Former     Types: Cigarettes    Smokeless tobacco: Never   Substance and Sexual Activity    Alcohol use: Not Currently    Drug use: Never     Past  Surgical History:   Procedure Laterality Date    arm surgery      BACK SURGERY      CHOLECYSTECTOMY      TUBAL LIGATION           Review of systems:  Review of Systems   Constitutional:  Positive for activity change. Negative for appetite change, chills, diaphoresis, fatigue and unexpected weight change.   HENT:  Negative for congestion, facial swelling, hearing loss, mouth sores, trouble swallowing and voice change.    Eyes:  Negative for photophobia, pain, discharge and itching.   Respiratory:  Positive for cough. Negative for apnea, choking, chest tightness and shortness of breath.    Cardiovascular:  Negative for chest pain, palpitations and leg swelling.   Gastrointestinal:  Negative for abdominal distention, abdominal pain, anal bleeding and blood in stool.   Endocrine: Negative for cold intolerance, heat intolerance, polydipsia and polyphagia.   Genitourinary:  Negative for difficulty urinating, dysuria, flank pain and hematuria.   Musculoskeletal:  Negative for arthralgias, back pain, joint swelling, myalgias, neck pain and neck stiffness.   Skin:  Negative for color change, pallor and wound.   Allergic/Immunologic: Positive for environmental allergies. Negative for food allergies and immunocompromised state.   Neurological:  Negative for dizziness, seizures, facial asymmetry, speech difficulty, light-headedness, numbness and headaches.   Hematological:  Negative for adenopathy. Does not bruise/bleed easily.   Psychiatric/Behavioral:  Negative for agitation, behavioral problems, confusion, decreased concentration and sleep disturbance.          Physical Exam  Vitals and nursing note reviewed.   Constitutional:       General: She is not in acute distress.     Appearance: Normal appearance. She is not ill-appearing.   HENT:      Head: Normocephalic and atraumatic.      Nose: No congestion or rhinorrhea.   Eyes:      General: No scleral icterus.     Extraocular Movements: Extraocular movements intact.       Pupils: Pupils are equal, round, and reactive to light.   Cardiovascular:      Rate and Rhythm: Normal rate and regular rhythm.      Pulses: Normal pulses.      Heart sounds: Normal heart sounds. No murmur heard.     No gallop.   Pulmonary:      Effort: Pulmonary effort is normal. No respiratory distress.      Breath sounds: Normal breath sounds. No stridor. No wheezing or rhonchi.   Abdominal:      General: Bowel sounds are normal. There is no distension.      Palpations: There is no mass.      Tenderness: There is no abdominal tenderness. There is no guarding.   Musculoskeletal:         General: No swelling, tenderness, deformity or signs of injury. Normal range of motion.      Cervical back: Normal range of motion and neck supple. No rigidity. No muscular tenderness.      Right lower leg: No edema.      Left lower leg: No edema.   Skin:     General: Skin is warm.      Coloration: Skin is not jaundiced or pale.      Findings: No bruising or lesion.   Neurological:      General: No focal deficit present.      Mental Status: She is alert and oriented to person, place, and time.      Cranial Nerves: No cranial nerve deficit.      Sensory: No sensory deficit.      Motor: No weakness.      Gait: Gait normal.   Psychiatric:         Mood and Affect: Mood normal.         Behavior: Behavior normal.         Thought Content: Thought content normal.     Vitals:    10/16/23 1327   BP: 118/67   Pulse: 92   Resp: 16     Body surface area is 1.74 meters squared.    Assessment/Plan:      ECOG 2    1. 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     == PET scan does not show any findings beyond the diaphragm and I believe with a lesions noted on both sides of the lung we are likely looking at extranodal marginal zone B-cell lymphoma arising from b/l  lungs.   == 6/20/22:  Hep  panel negative. TMB discussion in the interim and also discussion with Radiation Oncology, also taking into account her lung function.  Per Dr. Ren radiation oncology ISRT is not an option considering we are looking at multiple lesions on bilateral lungs.  Also due to her decreased lung function surgical resection is not a possibility.  Consents is made per TMB discussion was to start systemic treatment with Rituximab.  Will obtain port placement and prior authorization request for rituximab  == 6/27/22:  Patient and daughter here today to discuss upcoming chemotherapy, side effect profile, risk versus benefits, and expected outcomes have been discussed today. All questions and concerns answered and consents have been signed. Port placed while in patient, healing well. Plan is to begin treatment on Thursday, labs reviewed, Hepatitis panel negative. LDH WNL, mild anemia noted.   == 7/6/22:  Patient reports being exposed to COVID yesterday at her residential home.  She reports 3 COVID vaccines but is unsure when her last booster was.  Will delay treatment for 1 week while patient is under quarantine and encouraged patient to notify clinic if she becomes symptomatic.  Plan is to reschedule cycle 2 Rituxan to Wednesday July 13th, okay to use labs from today.  She return to clinic in 2 weeks prior to cycle 3.   == 7/20/22:  Treatment will be delayed today until next Monday, patient was seen by pulmonology Dr. Dixon yesterday and was placed on antibiotic and steroids due to upper respiratory infection.  Infusion has been notified and plan is to complete cycle 3 on July 25, 2022.  No repeat labs needed treatment plan has been signed.  She will return to clinic in 1 week with labs prior to cycle 4 of 4 Rituxan.  == 12/3/22: S/p 4 cycles of Rituximab. PET scan 08/22 showed stable disease with no evidence of progression.  Continue maintenance Rituximab for consolidation--- rituximab Q 3 monthly for 2 years  == 3/28/23:  continues with maintenance rituxan. 3/2023 Recent pet shows stable disease. She reports recent URI approximately 2 weeks ago requiring abx and breathing treatments. Will check immunoglobulins.   == 6/27/23: Doing well, currently enrolled with [facility] by PCP, labs reviewed will proceed with rituxan as planned for today.   ==10/10/23: PET scan showed no evidence of lymphoma recurrence. Continue maintenance rituximab q 3 monthly for total 2 years  ==10/16/2023: Patient to clinic to discuss request for mediport removal.  She is on maintenance Rituximab q 3 months for total of 2 years. She receives Rituximab Hycela which is given subq.  I discussed potential for progression/recurrence and if that were to happen she would need mediport for chemotherapy. Patient reports she is 83 years old and she would likely not want more aggressive treatment if that were the case and she is very adamant about wanting mediport removed.  I will send surgical evaluation for mediport removal.    Keep previously scheduled appointments for rtc every 12 weeks with md labs cbc cmp and ldh  Refer for evaluation of mediport removal per pt request      Total time spent in counseling and discussion about further management options including relevant lab work, treatment,  prognosis, medications and intended side effects was more than 60 minutes. More than 50% of the time was spent on counseling and coordination of care.  I spent a total of 60 minutes on the day of the visit.This includes face to face time and non-face to face time preparing to see the patient (eg, review of tests), Obtaining and/or reviewing separately obtained history, Documenting clinical information in the electronic or other health record, Independently interpreting resultsand communicating results to the patient/family/caregiver, or Care coordination.

## 2023-10-18 ENCOUNTER — OFFICE VISIT (OUTPATIENT)
Dept: SURGERY | Facility: CLINIC | Age: 83
End: 2023-10-18
Payer: MEDICARE

## 2023-10-18 DIAGNOSIS — C88.4 EXTRANODAL MARGINAL ZONE B-CELL LYMPHOMA: Primary | ICD-10-CM

## 2023-10-18 PROCEDURE — 99203 PR OFFICE/OUTPT VISIT, NEW, LEVL III, 30-44 MIN: ICD-10-PCS | Mod: S$GLB,,, | Performed by: SURGERY

## 2023-10-18 PROCEDURE — 99203 OFFICE O/P NEW LOW 30 MIN: CPT | Mod: S$GLB,,, | Performed by: SURGERY

## 2023-10-18 NOTE — PROGRESS NOTES
History & Physical    SUBJECTIVE:     History of Present Illness:    83-year-old female removal of right jugular venous access port.  Patient with personal history of B-cell lymphoma and has completed therapy and desires port removal.    Chief Complaint   Patient presents with    Other     Port removal          Review of patient's allergies indicates:  Review of patient's allergies indicates:   Allergen Reactions    Bloodstop bandage     Codeine     Ultram [tramadol] Itching       Current Outpatient Medications on File Prior to Visit   Medication Sig Dispense Refill    allopurinoL (ZYLOPRIM) 100 MG tablet       b complex vitamins tablet Take 1 tablet by mouth once daily.      citalopram (CELEXA) 20 MG tablet       donepeziL (ARICEPT) 10 MG tablet       fluticasone-umeclidin-vilanter (TRELEGY ELLIPTA) 200-62.5-25 mcg inhaler Inhale 1 puff into the lungs once daily.      furosemide (LASIX) 20 MG tablet       furosemide (LASIX) 40 MG tablet       gabapentin (NEURONTIN) 600 MG tablet       ipratropium (ATROVENT) 42 mcg (0.06 %) nasal spray 2 sprays by Nasal route 4 (four) times daily.      levoFLOXacin (LEVAQUIN) 500 MG tablet       LIDOcaine-prilocaine (EMLA) cream Apply topically as needed. 30 g 0    melatonin (MELATIN) 5 mg Take by mouth every evening.      metoprolol succinate (TOPROL-XL) 50 MG 24 hr tablet       mirtazapine (REMERON) 15 MG tablet       multivitamin capsule Take 1 capsule by mouth once daily.      predniSONE (DELTASONE) 10 MG tablet       rosuvastatin (CRESTOR) 5 MG tablet       vitamin D (VITAMIN D3) 1000 units Tab Take 1,000 Units by mouth once daily.       No current facility-administered medications on file prior to visit.       Past Medical History:   Diagnosis Date    Arthritis     COPD (chronic obstructive pulmonary disease)     Hypertension      Past Surgical History:   Procedure Laterality Date    arm surgery      BACK SURGERY      CHOLECYSTECTOMY      TUBAL LIGATION       Family History    Problem Relation Age of Onset    Lung cancer Father     Brain cancer Brother     Breast cancer Paternal Grandmother     Cancer Son        Social History     Socioeconomic History    Marital status:    Tobacco Use    Smoking status: Former     Types: Cigarettes    Smokeless tobacco: Never   Substance and Sexual Activity    Alcohol use: Not Currently    Drug use: Never          Review of Systems   Constitutional: Negative.    Respiratory: Negative.     Cardiovascular: Negative.    Gastrointestinal: Negative.    Musculoskeletal:  Positive for joint pain.   Neurological: Negative.    Endo/Heme/Allergies: Negative.    Psychiatric/Behavioral: Negative.         OBJECTIVE:     There were no vitals filed for this visit.              Physical Exam:  Physical Exam  Constitutional:       Appearance: Normal appearance. She is normal weight.   HENT:      Head: Normocephalic.   Eyes:      Pupils: Pupils are equal, round, and reactive to light.   Cardiovascular:      Rate and Rhythm: Normal rate and regular rhythm.   Pulmonary:      Breath sounds: Normal breath sounds.   Abdominal:      General: Abdomen is flat. Bowel sounds are normal.      Palpations: Abdomen is soft.   Musculoskeletal:         General: No swelling. Normal range of motion.      Cervical back: Normal range of motion.   Skin:     General: Skin is warm and dry.   Neurological:      General: No focal deficit present.      Mental Status: She is alert and oriented to person, place, and time.      Cranial Nerves: No cranial nerve deficit.   Psychiatric:         Mood and Affect: Mood normal.         Behavior: Behavior normal.             ASSESSMENT/PLAN:   Personal history of B-cell lymphoma, completion of therapy  PLAN:  Venous access port removal on the right chest will be done October 26, 2023.  We will do this under local/mac

## 2023-10-26 ENCOUNTER — OUTSIDE PLACE OF SERVICE (OUTPATIENT)
Dept: SURGERY | Facility: CLINIC | Age: 83
End: 2023-10-26

## 2023-10-26 PROCEDURE — 36590 REMOVAL TUNNELED CV CATH: CPT | Mod: ,,, | Performed by: SURGERY

## 2023-10-26 PROCEDURE — 36590 PR REMOVAL TUNNELED CV CATH W SUBQ PORT OR PUMP: ICD-10-PCS | Mod: ,,, | Performed by: SURGERY

## 2023-11-01 ENCOUNTER — OFFICE VISIT (OUTPATIENT)
Dept: SURGERY | Facility: CLINIC | Age: 83
End: 2023-11-01
Payer: MEDICARE

## 2023-11-01 DIAGNOSIS — Z98.890 POST-OPERATIVE STATE: Primary | ICD-10-CM

## 2023-11-01 PROCEDURE — 99024 PR POST-OP FOLLOW-UP VISIT: ICD-10-PCS | Mod: S$GLB,POP,, | Performed by: SURGERY

## 2023-11-01 PROCEDURE — 99024 POSTOP FOLLOW-UP VISIT: CPT | Mod: S$GLB,POP,, | Performed by: SURGERY

## 2023-11-01 NOTE — PROGRESS NOTES
HPI:  Revisit for suture removal status post right jugular venous access port removal    PHYSICAL EXAM:  Incision clean and dry with no redness or drainage noted.  Subcuticular suture removed  ASSESSMENT:    Stable postop  PLAN:     Revisit as needed

## 2024-01-08 DIAGNOSIS — C88.4 EXTRANODAL MARGINAL ZONE B-CELL LYMPHOMA: Primary | ICD-10-CM

## 2024-01-11 ENCOUNTER — TELEPHONE (OUTPATIENT)
Dept: HEMATOLOGY/ONCOLOGY | Facility: CLINIC | Age: 84
End: 2024-01-11
Payer: MEDICARE

## 2024-01-11 NOTE — TELEPHONE ENCOUNTER
----- Message from Lance Carpenter sent at 1/11/2024 10:28 AM CST -----  Contact: Daysi Lujan is calling to get information on a Pet Scan. Please call back at [phone]                Thanks  SW

## 2024-01-12 DIAGNOSIS — C88.4 EXTRANODAL MARGINAL ZONE B-CELL LYMPHOMA: Primary | ICD-10-CM

## 2024-01-23 ENCOUNTER — TELEPHONE (OUTPATIENT)
Dept: HEMATOLOGY/ONCOLOGY | Facility: CLINIC | Age: 84
End: 2024-01-23

## 2024-01-23 NOTE — TELEPHONE ENCOUNTER
LVMx1 to r/s pt appt.     ----- Message from Madelin Laguerre sent at 1/23/2024  8:38 AM CST -----  Contact: Tiffany Tavarez needs a call back at [phone], Regards to getting her appointment reschedule due to car trouble.    Thanks  td

## 2024-01-24 ENCOUNTER — OFFICE VISIT (OUTPATIENT)
Dept: HEMATOLOGY/ONCOLOGY | Facility: CLINIC | Age: 84
End: 2024-01-24
Payer: MEDICARE

## 2024-01-24 VITALS
SYSTOLIC BLOOD PRESSURE: 130 MMHG | RESPIRATION RATE: 18 BRPM | DIASTOLIC BLOOD PRESSURE: 48 MMHG | HEIGHT: 62 IN | OXYGEN SATURATION: 94 % | WEIGHT: 151.88 LBS | HEART RATE: 76 BPM | BODY MASS INDEX: 27.95 KG/M2

## 2024-01-24 DIAGNOSIS — C88.4 EXTRANODAL MARGINAL ZONE B-CELL LYMPHOMA: Primary | ICD-10-CM

## 2024-01-24 LAB
ALBUMIN SERPL BCP-MCNC: 3.2 G/DL (ref 3.4–5)
ALBUMIN/GLOBULIN RATIO: 0.82 RATIO (ref 1.1–1.8)
ALP SERPL-CCNC: 78 U/L (ref 46–116)
ALT SERPL W P-5'-P-CCNC: 28 U/L (ref 12–78)
ANION GAP SERPL CALC-SCNC: 10 MMOL/L (ref 3–11)
AST SERPL-CCNC: 18 U/L (ref 15–37)
BASOPHILS NFR BLD: 0.9 % (ref 0–3)
BILIRUB SERPL-MCNC: 0.4 MG/DL (ref 0–1)
BUN SERPL-MCNC: 21 MG/DL (ref 7–18)
BUN/CREAT SERPL: 16.66 RATIO (ref 7–18)
CALCIUM SERPL-MCNC: 9.1 MG/DL (ref 8.8–10.5)
CHLORIDE SERPL-SCNC: 103 MMOL/L (ref 100–108)
CO2 SERPL-SCNC: 29 MMOL/L (ref 21–32)
CREAT SERPL-MCNC: 1.26 MG/DL (ref 0.55–1.02)
EOSINOPHIL NFR BLD: 3.4 % (ref 1–3)
ERYTHROCYTE [DISTWIDTH] IN BLOOD BY AUTOMATED COUNT: 14.9 % (ref 12.5–18)
GFR ESTIMATION: 40
GLOBULIN: 3.9 G/DL (ref 2.3–3.5)
GLUCOSE SERPL-MCNC: 107 MG/DL (ref 70–110)
HCT VFR BLD AUTO: 34.2 % (ref 37–47)
HGB BLD-MCNC: 11.8 G/DL (ref 12–16)
LDH SERPL L TO P-CCNC: 184 U/L (ref 82–234)
LYMPHOCYTES NFR BLD: 44.5 % (ref 25–40)
MACROCYTES BLD QL SMEAR: NORMAL
MCH RBC QN AUTO: 35.9 PG (ref 27–31.2)
MCHC RBC AUTO-ENTMCNC: 34.5 G/DL (ref 31.8–35.4)
MCV RBC AUTO: 104 FL (ref 80–97)
MONOCYTES NFR BLD: 9.2 % (ref 1–15)
NEUTROPHILS # BLD AUTO: 2.91 10*3/UL (ref 1.8–7.7)
NEUTROPHILS NFR BLD: 41.6 % (ref 37–80)
NUCLEATED RED BLOOD CELLS: 0 %
PLATELETS: 124 10*3/UL (ref 142–424)
POTASSIUM SERPL-SCNC: 4 MMOL/L (ref 3.6–5.2)
PROT SERPL-MCNC: 7.1 G/DL (ref 6.4–8.2)
RBC # BLD AUTO: 3.29 10*6/UL (ref 4.2–5.4)
SODIUM BLD-SCNC: 142 MMOL/L (ref 135–145)
WBC # BLD: 7 10*3/UL (ref 4.6–10.2)

## 2024-01-24 PROCEDURE — 99214 OFFICE O/P EST MOD 30 MIN: CPT | Mod: GV,S$GLB,, | Performed by: INTERNAL MEDICINE

## 2024-01-24 RX ORDER — HEPARIN 100 UNIT/ML
500 SYRINGE INTRAVENOUS
Status: CANCELLED | OUTPATIENT
Start: 2024-01-24

## 2024-01-24 RX ORDER — ACETAMINOPHEN 325 MG/1
650 TABLET ORAL
Status: CANCELLED | OUTPATIENT
Start: 2024-01-24

## 2024-01-24 RX ORDER — DIPHENHYDRAMINE HCL 25 MG
50 CAPSULE ORAL
Status: CANCELLED | OUTPATIENT
Start: 2024-01-24

## 2024-01-24 RX ORDER — MEPERIDINE HYDROCHLORIDE 50 MG/ML
25 INJECTION INTRAMUSCULAR; INTRAVENOUS; SUBCUTANEOUS
Status: CANCELLED | OUTPATIENT
Start: 2024-01-24

## 2024-01-24 RX ORDER — FAMOTIDINE 20 MG/1
20 TABLET, FILM COATED ORAL
Status: CANCELLED | OUTPATIENT
Start: 2024-01-24

## 2024-01-24 RX ORDER — SODIUM CHLORIDE 0.9 % (FLUSH) 0.9 %
10 SYRINGE (ML) INJECTION
Status: CANCELLED | OUTPATIENT
Start: 2024-01-24

## 2024-01-24 NOTE — PROGRESS NOTES
MEDICAL ONCOLOGY FOLLOW UP CONSULTATION NOTE    Patient ID: Dayis Solorzano is a 83 y.o. female.    Chief Complaint: B cell Lymphoma    HPI:  Patient is 82-year-old female with past medical history of COPD, emphysema, bronchitis, depression, gastroesophageal reflux disease, hypertension, congestive heart failure with ejection fraction normalized after biventricular pacing, atelectasis and scarring, degenerative joint disease, hyperlipidemia, sleep apnea, dilated cardiomyopathy, ventricular tachycardia status post ICD, coronary artery disease, chronic back pain, radiculopathy who was referred by Pulmonary after imaging revealed a 1.9 cm left lower lobe nodular density by CT/PET in March of 2019 with SUV 2. Patient underwent repeat PET-CT and left lower lobe CT-guided needle biopsy secondary to enlarging left lower lobe lesion.  Findings were consistent with B-cell lymphoma.  Please see detailed pathology report below.  Patient presents to our clinic today for further evaluation.      Pathology: 06/08/22    Lung nodule, left lower lobe, biopsy:  Extranodal marginal zone B-cell lymphoma (see comment).    Comment:  Sections of the small biopsy show a population of small to medium, round atypical lymphocytes with coarse chromatin and occasional nucleoli, demonstrating moderate cytoplasm and monocyte toyed morphology.  Immunohistochemical stains are performed on block 1A with appropriate controls (in addition to being repeated with flow cytometry) in order to further evaluate the atypical lymphocytes.  The cells of interest demonstrated positive staining for CD 20, [place] 5 and BCL 2 while showing negative straining for CD3 and CD5 (highlights T-cells), BCL6 and CD10 (highlights rare diminutive germinal centers) and cyclin D1.  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    Interpretation:  Taken together these  results demonstrate the presence of clonal population of B lymphocytes.  The immunophenotype in this case in our corrected stick of chronic lymphocytic leukemia/small lymphocytic lymphoma or mantle cell lymphoma.        Imaging:     PET/CT scan: 6/1/22    Chest:   1. There is an area of ground-glass infiltration seen in the left posterior lung.  This measures maximum of approximately 14 mm today versus approximately 8 mm on the previous examination.  The SUV in this area is approximately 1.5.  Given the growth of this lesion this would be suspicious for a possible low-grade malignancy.  2. An ill-defined area of ground-glass infiltrate is seen in the right mid lung field measuring approximately 1.3 cm.  This was not present on the previous examination.  This has an SUV of 1.7  3. A small area of ground-glass infiltrate is seen in the right lower lobe region measuring about 1 cm in diameter this has an SUV in the area of approximately 1.1  4. An area of spiculated parenchymal densities seen in the left lower lobe having a maximum diameter of approximately 2 cm.  This had a diameter of about 1.9 cm on previous examination.  The SUV in this is approximately 3.    Impression:  Multiple areas of parenchymal density in the lungs.  These are either slightly enlarged are new when compared to previous examinations.  Uptake values would be consistent with inflammatory, infectious or low-grade malignancy.      Past Medical History:   Diagnosis Date    Arthritis     COPD (chronic obstructive pulmonary disease)     Hypertension      Family History   Problem Relation Age of Onset    Lung cancer Father     Brain cancer Brother     Breast cancer Paternal Grandmother     Cancer Son      Social History     Socioeconomic History    Marital status:    Tobacco Use    Smoking status: Former     Types: Cigarettes    Smokeless tobacco: Never   Substance and Sexual Activity    Alcohol use: Not Currently    Drug use: Never     Past  Surgical History:   Procedure Laterality Date    arm surgery      BACK SURGERY      CHOLECYSTECTOMY      TUBAL LIGATION           Review of systems:  Review of Systems   Constitutional:  Positive for activity change. Negative for appetite change, chills, diaphoresis, fatigue and unexpected weight change.   HENT:  Negative for congestion, facial swelling, hearing loss, mouth sores, trouble swallowing and voice change.    Eyes:  Negative for photophobia, pain, discharge and itching.   Respiratory:  Positive for cough. Negative for apnea, choking, chest tightness and shortness of breath.    Cardiovascular:  Negative for chest pain, palpitations and leg swelling.   Gastrointestinal:  Negative for abdominal distention, abdominal pain, anal bleeding and blood in stool.   Endocrine: Negative for cold intolerance, heat intolerance, polydipsia and polyphagia.   Genitourinary:  Negative for difficulty urinating, dysuria, flank pain and hematuria.   Musculoskeletal:  Negative for arthralgias, back pain, joint swelling, myalgias, neck pain and neck stiffness.   Skin:  Negative for color change, pallor and wound.   Allergic/Immunologic: Positive for environmental allergies. Negative for food allergies and immunocompromised state.   Neurological:  Negative for dizziness, seizures, facial asymmetry, speech difficulty, light-headedness, numbness and headaches.   Hematological:  Negative for adenopathy. Does not bruise/bleed easily.   Psychiatric/Behavioral:  Negative for agitation, behavioral problems, confusion, decreased concentration and sleep disturbance.          Physical Exam  Vitals and nursing note reviewed.   Constitutional:       General: She is not in acute distress.     Appearance: Normal appearance. She is not ill-appearing.   HENT:      Head: Normocephalic and atraumatic.      Nose: No congestion or rhinorrhea.   Eyes:      General: No scleral icterus.     Extraocular Movements: Extraocular movements intact.       Pupils: Pupils are equal, round, and reactive to light.   Cardiovascular:      Rate and Rhythm: Normal rate and regular rhythm.      Pulses: Normal pulses.      Heart sounds: Normal heart sounds. No murmur heard.     No gallop.   Pulmonary:      Effort: Pulmonary effort is normal. No respiratory distress.      Breath sounds: Normal breath sounds. No stridor. No wheezing or rhonchi.   Abdominal:      General: Bowel sounds are normal. There is no distension.      Palpations: There is no mass.      Tenderness: There is no abdominal tenderness. There is no guarding.   Musculoskeletal:         General: No swelling, tenderness, deformity or signs of injury. Normal range of motion.      Cervical back: Normal range of motion and neck supple. No rigidity. No muscular tenderness.      Right lower leg: No edema.      Left lower leg: No edema.   Skin:     General: Skin is warm.      Coloration: Skin is not jaundiced or pale.      Findings: No bruising or lesion.   Neurological:      General: No focal deficit present.      Mental Status: She is alert and oriented to person, place, and time.      Cranial Nerves: No cranial nerve deficit.      Sensory: No sensory deficit.      Motor: No weakness.      Gait: Gait normal.   Psychiatric:         Mood and Affect: Mood normal.         Behavior: Behavior normal.         Thought Content: Thought content normal.       Vitals:    01/24/24 1409   BP: (!) 130/48   Pulse: 76   Resp: 18     Body surface area is 1.74 meters squared.    Assessment/Plan:      ECOG 2    1. 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     == PET scan does not show any findings beyond the diaphragm and I believe with a lesions noted on both sides of the lung we are likely looking at extranodal marginal zone B-cell lymphoma arising from b/l  lungs.   == 6/20/22:   Hep panel negative. TMB discussion in the interim and also discussion with Radiation Oncology, also taking into account her lung function.  Per Dr. Ren radiation oncology ISRT is not an option considering we are looking at multiple lesions on bilateral lungs.  Also due to her decreased lung function surgical resection is not a possibility.  Consents is made per TMB discussion was to start systemic treatment with Rituximab.  Will obtain port placement and prior authorization request for rituximab  == 6/27/22:  Patient and daughter here today to discuss upcoming chemotherapy, side effect profile, risk versus benefits, and expected outcomes have been discussed today. All questions and concerns answered and consents have been signed. Port placed while in patient, healing well. Plan is to begin treatment on Thursday, labs reviewed, Hepatitis panel negative. LDH WNL, mild anemia noted.   == 7/6/22:  Patient reports being exposed to COVID yesterday at her care home home.  She reports 3 COVID vaccines but is unsure when her last booster was.  Will delay treatment for 1 week while patient is under quarantine and encouraged patient to notify clinic if she becomes symptomatic.  Plan is to reschedule cycle 2 Rituxan to Wednesday July 13th, okay to use labs from today.  She return to clinic in 2 weeks prior to cycle 3.   == 7/20/22:  Treatment will be delayed today until next Monday, patient was seen by pulmonology Dr. Dixon yesterday and was placed on antibiotic and steroids due to upper respiratory infection.  Infusion has been notified and plan is to complete cycle 3 on July 25, 2022.  No repeat labs needed treatment plan has been signed.  She will return to clinic in 1 week with labs prior to cycle 4 of 4 Rituxan.  == 12/3/22: S/p 4 cycles of Rituximab. PET scan 08/22 showed stable disease with no evidence of progression.  Continue maintenance Rituximab for consolidation--- rituximab Q 3 monthly for 2 years  == 3/28/23:  continues with maintenance rituxan. 3/2023 Recent pet shows stable disease. She reports recent URI approximately 2 weeks ago requiring abx and breathing treatments. Will check immunoglobulins.   == 6/27/23: Doing well, currently enrolled with [facility] by PCP, labs reviewed will proceed with rituxan as planned for today.   ==10/10/23: PET scan showed no evidence of lymphoma recurrence. Continue maintenance rituximab q 3 monthly for total 2 years  ==10/16/23: Patient to clinic to discuss request for mediport removal.  She is on maintenance Rituximab q 3 months for total of 2 years. She receives Rituximab Hycela which is given subq.  I discussed potential for progression/recurrence and if that were to happen she would need mediport for chemotherapy. Patient reports she is 83 years old and she would likely not want more aggressive treatment if that were the case and she is very adamant about wanting mediport removed.  I will send surgical evaluation for mediport removal.  == 1/24/23: Continue with Rituximab q 3 monthly for total 2 years. S/p port removal per patient request. After completion of 2 years of Rituximab plan would be for observation with repeat PET scan q 3-6 monthly        Total time spent in counseling and discussion about further management options including relevant lab work, treatment,  prognosis, medications and intended side effects was more than 25 minutes. More than 50% of the time was spent on counseling and coordination of care.  I spent a total of 25 minutes on the day of the visit.This includes face to face time and non-face to face time preparing to see the patient (eg, review of tests), Obtaining and/or reviewing separately obtained history, Documenting clinical information in the electronic or other health record, Independently interpreting resultsand communicating results to the patient/family/caregiver, or Care coordination.

## 2024-04-18 ENCOUNTER — TELEPHONE (OUTPATIENT)
Dept: HEMATOLOGY/ONCOLOGY | Facility: CLINIC | Age: 84
End: 2024-04-18
Payer: MEDICARE

## 2024-04-18 NOTE — TELEPHONE ENCOUNTER
----- Message from Crystal Albrecht NP sent at 4/17/2024  4:01 PM CDT -----  She shouldn't be due yet. She will have q 3-6 months once she completes her 2 years of Rituxan.  She started Rituxan on 6/30/22 so will finish approx 6/30/24.  ----- Message -----  From: Marlin Arguello RN  Sent: 4/17/2024   2:55 PM CDT  To: Devi Bernal NP; Leighton Ring MD; #    She will need an updated order if so. Thanks!  ----- Message -----  From: Laura Fatima  Sent: 4/17/2024   1:58 PM CDT  To: Jhonathan Manzanares Staff    Type: General Call Back         Name of Caller:pt  Would the patient rather a call back or a response via [facility]? call  Best Call Back Number:[phone]   Additional Information: Pt would like to speak to nurse in office regarding if she's suppose to have a pet scan performed before 04/24 appt with Dr. Ring. Please call pt with further info and assistance. Thank you.

## 2024-05-07 DIAGNOSIS — C88.4 EXTRANODAL MARGINAL ZONE B-CELL LYMPHOMA: Primary | ICD-10-CM

## 2024-05-07 DIAGNOSIS — Z79.620 LONG-TERM CURRENT USE OF RITUXIMAB: ICD-10-CM

## 2024-05-13 ENCOUNTER — TELEPHONE (OUTPATIENT)
Dept: HEMATOLOGY/ONCOLOGY | Facility: CLINIC | Age: 84
End: 2024-05-13
Payer: MEDICARE

## 2024-05-13 NOTE — TELEPHONE ENCOUNTER
Spoke w/patient and she just wanted Dr. Ring to know why she missed her last appt. She stated that she does not wish to reschedule at this time due to her still being in the hospital in rehab. She will call us back to stephen

## 2024-05-13 NOTE — TELEPHONE ENCOUNTER
----- Message from Stephen Antonio sent at 5/13/2024  8:02 AM CDT -----  Contact: Daysi Lujan was calling to inform staff she currently in rehab from fall and was on floor around 9 hours. Pt can be reached at [phone].        Thanks  [place]

## 2024-06-05 ENCOUNTER — TELEPHONE (OUTPATIENT)
Dept: HEMATOLOGY/ONCOLOGY | Facility: CLINIC | Age: 84
End: 2024-06-05
Payer: MEDICARE

## 2024-06-05 NOTE — TELEPHONE ENCOUNTER
----- Message from Mercedez Garza sent at 6/5/2024  9:27 AM CDT -----   Patient is calling to scheduled afternoon appointment also would like to know if labs are need it please call her back at [phone]

## 2024-06-06 ENCOUNTER — TELEPHONE (OUTPATIENT)
Dept: HEMATOLOGY/ONCOLOGY | Facility: CLINIC | Age: 84
End: 2024-06-06
Payer: MEDICARE

## 2024-06-06 NOTE — TELEPHONE ENCOUNTER
LVM for patient to call back to RS appt.    \  LO 6/6/24 @4:10pm    ----- Message from Sophia Carlson sent at 6/6/2024  3:20 PM CDT -----  Contact: Patient  Patient called to consult with nurse or staff regarding rescheduling her appointment. She would like a call back to schedule and can be reached at [phone]. Thanks/MR

## 2024-06-06 NOTE — TELEPHONE ENCOUNTER
----- Message from Daysi Luciano sent at 6/6/2024  4:48 PM CDT -----  Regarding: Reschedule  Contact: patient  Per phone call with patient she stated that she missed her appointment on 05/07/2024 because she was in the hospital and the caller would like to reschedule the appointment.  Please return call at [phone] (home) .    Thanks,  SJ

## 2024-06-10 DIAGNOSIS — C88.4 EXTRANODAL MARGINAL ZONE B-CELL LYMPHOMA: Primary | ICD-10-CM

## 2024-06-11 ENCOUNTER — OFFICE VISIT (OUTPATIENT)
Dept: HEMATOLOGY/ONCOLOGY | Facility: CLINIC | Age: 84
End: 2024-06-11
Payer: MEDICARE

## 2024-06-11 VITALS
OXYGEN SATURATION: 90 % | HEIGHT: 62 IN | SYSTOLIC BLOOD PRESSURE: 121 MMHG | BODY MASS INDEX: 28.74 KG/M2 | DIASTOLIC BLOOD PRESSURE: 67 MMHG | HEART RATE: 52 BPM | WEIGHT: 156.19 LBS

## 2024-06-11 DIAGNOSIS — C88.4 EXTRANODAL MARGINAL ZONE B-CELL LYMPHOMA: Primary | ICD-10-CM

## 2024-06-11 LAB
ALBUMIN SERPL BCP-MCNC: 3.3 G/DL (ref 3.4–5)
ALBUMIN/GLOBULIN RATIO: 0.92 RATIO (ref 1.1–1.8)
ALP SERPL-CCNC: 92 U/L (ref 46–116)
ALT SERPL W P-5'-P-CCNC: 19 U/L (ref 12–78)
ANION GAP SERPL CALC-SCNC: 6 MMOL/L (ref 3–11)
AST SERPL-CCNC: 22 U/L (ref 15–37)
BASOPHILS NFR BLD: 1.2 % (ref 0–3)
BILIRUB SERPL-MCNC: 0.8 MG/DL (ref 0–1)
BUN SERPL-MCNC: 17 MG/DL (ref 7–18)
BUN/CREAT SERPL: 13.38 RATIO (ref 7–18)
CALCIUM SERPL-MCNC: 9.5 MG/DL (ref 8.8–10.5)
CHLORIDE SERPL-SCNC: 106 MMOL/L (ref 100–108)
CO2 SERPL-SCNC: 33 MMOL/L (ref 21–32)
CREAT SERPL-MCNC: 1.27 MG/DL (ref 0.55–1.02)
EOSINOPHIL NFR BLD: 4.5 % (ref 1–3)
ERYTHROCYTE [DISTWIDTH] IN BLOOD BY AUTOMATED COUNT: 14.5 % (ref 12.5–18)
GFR ESTIMATION: 40
GLOBULIN: 3.6 G/DL (ref 2.3–3.5)
GLUCOSE SERPL-MCNC: 99 MG/DL (ref 70–110)
HCT VFR BLD AUTO: 36.2 % (ref 37–47)
HGB BLD-MCNC: 11.7 G/DL (ref 12–16)
LDH SERPL L TO P-CCNC: 212 U/L (ref 82–234)
LYMPHOCYTES NFR BLD: 28.4 % (ref 25–40)
MACROCYTES BLD QL SMEAR: NORMAL
MCH RBC QN AUTO: 32.5 PG (ref 27–31.2)
MCHC RBC AUTO-ENTMCNC: 32.3 G/DL (ref 31.8–35.4)
MCV RBC AUTO: 100.6 FL (ref 80–97)
MONOCYTES NFR BLD: 7.7 % (ref 1–15)
NEUTROPHILS # BLD AUTO: 3.96 10*3/UL (ref 1.8–7.7)
NEUTROPHILS NFR BLD: 57.9 % (ref 37–80)
NUCLEATED RED BLOOD CELLS: 0 %
PLATELETS: 150 10*3/UL (ref 142–424)
POTASSIUM SERPL-SCNC: 3.2 MMOL/L (ref 3.6–5.2)
PROT SERPL-MCNC: 6.9 G/DL (ref 6.4–8.2)
RBC # BLD AUTO: 3.6 10*6/UL (ref 4.2–5.4)
SODIUM BLD-SCNC: 145 MMOL/L (ref 135–145)
WBC # BLD: 6.8 10*3/UL (ref 4.6–10.2)

## 2024-06-11 PROCEDURE — 99215 OFFICE O/P EST HI 40 MIN: CPT | Mod: GW,S$GLB,, | Performed by: INTERNAL MEDICINE

## 2024-06-11 NOTE — PROGRESS NOTES
MEDICAL ONCOLOGY FOLLOW UP CONSULTATION NOTE    Patient ID: Daysi Solorzano is a 84 y.o. female.    Chief Complaint: B cell Lymphoma    HPI:  Patient is 82-year-old female with past medical history of COPD, emphysema, bronchitis, depression, gastroesophageal reflux disease, hypertension, congestive heart failure with ejection fraction normalized after biventricular pacing, atelectasis and scarring, degenerative joint disease, hyperlipidemia, sleep apnea, dilated cardiomyopathy, ventricular tachycardia status post ICD, coronary artery disease, chronic back pain, radiculopathy who was referred by Pulmonary after imaging revealed a 1.9 cm left lower lobe nodular density by CT/PET in March of 2019 with SUV 2. Patient underwent repeat PET-CT and left lower lobe CT-guided needle biopsy secondary to enlarging left lower lobe lesion.  Findings were consistent with B-cell lymphoma.  Please see detailed pathology report below.  Patient presents to our clinic today for further evaluation.      Pathology: 06/08/22    Lung nodule, left lower lobe, biopsy:  Extranodal marginal zone B-cell lymphoma (see comment).    Comment:  Sections of the small biopsy show a population of small to medium, round atypical lymphocytes with coarse chromatin and occasional nucleoli, demonstrating moderate cytoplasm and monocyte toyed morphology.  Immunohistochemical stains are performed on block 1A with appropriate controls (in addition to being repeated with flow cytometry) in order to further evaluate the atypical lymphocytes.  The cells of interest demonstrated positive staining for CD 20, Ellsworth 5 and BCL 2 while showing negative straining for CD3 and CD5 (highlights T-cells), BCL6 and CD10 (highlights rare diminutive germinal centers) and cyclin D1.  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    Interpretation:  Taken together these  results demonstrate the presence of clonal population of B lymphocytes.  The immunophenotype in this case in our corrected stick of chronic lymphocytic leukemia/small lymphocytic lymphoma or mantle cell lymphoma.        Imaging:     PET/CT scan: 6/1/22    Chest:   1. There is an area of ground-glass infiltration seen in the left posterior lung.  This measures maximum of approximately 14 mm today versus approximately 8 mm on the previous examination.  The SUV in this area is approximately 1.5.  Given the growth of this lesion this would be suspicious for a possible low-grade malignancy.  2. An ill-defined area of ground-glass infiltrate is seen in the right mid lung field measuring approximately 1.3 cm.  This was not present on the previous examination.  This has an SUV of 1.7  3. A small area of ground-glass infiltrate is seen in the right lower lobe region measuring about 1 cm in diameter this has an SUV in the area of approximately 1.1  4. An area of spiculated parenchymal densities seen in the left lower lobe having a maximum diameter of approximately 2 cm.  This had a diameter of about 1.9 cm on previous examination.  The SUV in this is approximately 3.    Impression:  Multiple areas of parenchymal density in the lungs.  These are either slightly enlarged are new when compared to previous examinations.  Uptake values would be consistent with inflammatory, infectious or low-grade malignancy.      Past Medical History:   Diagnosis Date    Arthritis     COPD (chronic obstructive pulmonary disease)     Hypertension      Family History   Problem Relation Name Age of Onset    Lung cancer Father      Brain cancer Brother      Breast cancer Paternal Grandmother      Cancer Son lymphoma      Social History     Socioeconomic History    Marital status:    Tobacco Use    Smoking status: Former     Types: Cigarettes    Smokeless tobacco: Never   Substance and Sexual Activity    Alcohol use: Not Currently    Drug use:  Never     Past Surgical History:   Procedure Laterality Date    arm surgery      BACK SURGERY      CHOLECYSTECTOMY      TUBAL LIGATION           Review of systems:  Review of Systems   Constitutional:  Positive for activity change. Negative for appetite change, chills, diaphoresis, fatigue and unexpected weight change.   HENT:  Negative for congestion, facial swelling, hearing loss, mouth sores, trouble swallowing and voice change.    Eyes:  Negative for photophobia, pain, discharge and itching.   Respiratory:  Positive for cough. Negative for apnea, choking, chest tightness and shortness of breath.    Cardiovascular:  Negative for chest pain, palpitations and leg swelling.   Gastrointestinal:  Negative for abdominal distention, abdominal pain, anal bleeding and blood in stool.   Endocrine: Negative for cold intolerance, heat intolerance, polydipsia and polyphagia.   Genitourinary:  Negative for difficulty urinating, dysuria, flank pain and hematuria.   Musculoskeletal:  Negative for arthralgias, back pain, joint swelling, myalgias, neck pain and neck stiffness.   Skin:  Negative for color change, pallor and wound.   Allergic/Immunologic: Positive for environmental allergies. Negative for food allergies and immunocompromised state.   Neurological:  Negative for dizziness, seizures, facial asymmetry, speech difficulty, light-headedness, numbness and headaches.   Hematological:  Negative for adenopathy. Does not bruise/bleed easily.   Psychiatric/Behavioral:  Negative for agitation, behavioral problems, confusion, decreased concentration and sleep disturbance.          Physical Exam  Vitals and nursing note reviewed.   Constitutional:       General: She is not in acute distress.     Appearance: Normal appearance. She is not ill-appearing.   HENT:      Head: Normocephalic and atraumatic.      Nose: No congestion or rhinorrhea.   Eyes:      General: No scleral icterus.     Extraocular Movements: Extraocular movements  intact.      Pupils: Pupils are equal, round, and reactive to light.   Cardiovascular:      Rate and Rhythm: Normal rate and regular rhythm.      Pulses: Normal pulses.      Heart sounds: Normal heart sounds. No murmur heard.     No gallop.   Pulmonary:      Effort: Pulmonary effort is normal. No respiratory distress.      Breath sounds: Normal breath sounds. No stridor. No wheezing or rhonchi.   Abdominal:      General: Bowel sounds are normal. There is no distension.      Palpations: There is no mass.      Tenderness: There is no abdominal tenderness. There is no guarding.   Musculoskeletal:         General: No swelling, tenderness, deformity or signs of injury. Normal range of motion.      Cervical back: Normal range of motion and neck supple. No rigidity. No muscular tenderness.      Right lower leg: No edema.      Left lower leg: No edema.   Skin:     General: Skin is warm.      Coloration: Skin is not jaundiced or pale.      Findings: No bruising or lesion.   Neurological:      General: No focal deficit present.      Mental Status: She is alert and oriented to person, place, and time.      Cranial Nerves: No cranial nerve deficit.      Sensory: No sensory deficit.      Motor: No weakness.      Gait: Gait normal.   Psychiatric:         Mood and Affect: Mood normal.         Behavior: Behavior normal.         Thought Content: Thought content normal.       Vitals:    06/11/24 1414   BP: 121/67   Pulse: (!) 52   Resp: (P) 16       Body surface area is 1.76 meters squared.    Assessment/Plan:      ECOG 2    1. 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     == PET scan does not show any findings beyond the diaphragm and I believe with a lesions noted on both sides of the lung we are likely looking at extranodal marginal zone B-cell lymphoma arising from b/l   lungs.   == 6/20/22:  Hep panel negative. TMB discussion in the interim and also discussion with Radiation Oncology, also taking into account her lung function.  Per Dr. Ren radiation oncology ISRT is not an option considering we are looking at multiple lesions on bilateral lungs.  Also due to her decreased lung function surgical resection is not a possibility.  Consents is made per TMB discussion was to start systemic treatment with Rituximab.  Will obtain port placement and prior authorization request for rituximab  == 6/27/22:  Patient and daughter here today to discuss upcoming chemotherapy, side effect profile, risk versus benefits, and expected outcomes have been discussed today. All questions and concerns answered and consents have been signed. Port placed while in patient, healing well. Plan is to begin treatment on Thursday, labs reviewed, Hepatitis panel negative. LDH WNL, mild anemia noted.   == 7/6/22:  Patient reports being exposed to COVID yesterday at her intermediate home.  She reports 3 COVID vaccines but is unsure when her last booster was.  Will delay treatment for 1 week while patient is under quarantine and encouraged patient to notify clinic if she becomes symptomatic.  Plan is to reschedule cycle 2 Rituxan to Wednesday July 13th, okay to use labs from today.  She return to clinic in 2 weeks prior to cycle 3.   == 7/20/22:  Treatment will be delayed today until next Monday, patient was seen by pulmonology Dr. Dixon yesterday and was placed on antibiotic and steroids due to upper respiratory infection.  Infusion has been notified and plan is to complete cycle 3 on July 25, 2022.  No repeat labs needed treatment plan has been signed.  She will return to clinic in 1 week with labs prior to cycle 4 of 4 Rituxan.  == 12/3/22: S/p 4 cycles of Rituximab. PET scan 08/22 showed stable disease with no evidence of progression.  Continue maintenance Rituximab for consolidation--- rituximab Q 3 monthly  for 2 years  == 3/28/23: continues with maintenance rituxan. 3/2023 Recent pet shows stable disease. She reports recent URI approximately 2 weeks ago requiring abx and breathing treatments. Will check immunoglobulins.   == 6/27/23: Doing well, currently enrolled with [facility] by PCP, labs reviewed will proceed with rituxan as planned for today.   ==10/10/23: PET scan showed no evidence of lymphoma recurrence. Continue maintenance rituximab q 3 monthly for total 2 years  ==10/16/23: Patient to clinic to discuss request for mediport removal.  She is on maintenance Rituximab q 3 months for total of 2 years. She receives Rituximab Hycela which is given subq.  I discussed potential for progression/recurrence and if that were to happen she would need mediport for chemotherapy. Patient reports she is 83 years old and she would likely not want more aggressive treatment if that were the case and she is very adamant about wanting mediport removed.  I will send surgical evaluation for mediport removal.  == 1/24/23: Continue with Rituximab q 3 monthly for total 2 years. S/p port removal per patient request. After completion of 2 years of Rituximab plan would be for observation with repeat PET scan q 3-6 monthly.  == 6/11/24:  Patient presents to clinic after a gap of almost 1.5 years.  In the interim she reports she had a possible seizure/CVA and was found unresponsive on the floor about 6 months back.  She was brought to the hospital and had a prolonged hospitalization for which she was evaluated by various physicians including cardiologist and neurologist.  She wishes to establish care and to restart rituximab if needed.  I discussed with her that it has been 1.5 years and almost 1 year since her plan prophylactic/maintenance rituximab.  I would recommend repeat PET scan for further evaluation.  If any abnormalities are noted or signs for progression of disease then will consider a repeat biopsy for further  evaluation      Plan:   PET scan for restaging  Continue to follow with her other physicians including primary care, Cardiology, Neurology    Return to clinic in 3 weeks after PET scan results are back      Future Appointments   Date Time Provider Department Center   7/9/2024  2:20 PM Leighton Ring MD [facility]         Total time spent in counseling and discussion about further management options including relevant lab work, treatment,  prognosis, medications and intended side effects was more than 60 minutes. More than 50% of the time was spent on counseling and coordination of care.  I spent a total of 60 minutes on the day of the visit.This includes face to face time and non-face to face time preparing to see the patient (eg, review of tests), Obtaining and/or reviewing separately obtained history, Documenting clinical information in the electronic or other health record, Independently interpreting resultsand communicating results to the patient/family/caregiver, or Care coordination.

## 2024-07-22 ENCOUNTER — OFFICE VISIT (OUTPATIENT)
Dept: HEMATOLOGY/ONCOLOGY | Facility: CLINIC | Age: 84
End: 2024-07-22
Payer: MEDICARE

## 2024-07-22 VITALS
SYSTOLIC BLOOD PRESSURE: 147 MMHG | WEIGHT: 154.81 LBS | BODY MASS INDEX: 28.49 KG/M2 | DIASTOLIC BLOOD PRESSURE: 70 MMHG | HEART RATE: 103 BPM | OXYGEN SATURATION: 86 % | HEIGHT: 62 IN | RESPIRATION RATE: 16 BRPM

## 2024-07-22 DIAGNOSIS — C88.4 EXTRANODAL MARGINAL ZONE B-CELL LYMPHOMA: Primary | ICD-10-CM

## 2024-07-22 PROCEDURE — 99214 OFFICE O/P EST MOD 30 MIN: CPT | Mod: GV,S$GLB,, | Performed by: INTERNAL MEDICINE

## 2024-07-22 NOTE — PROGRESS NOTES
MEDICAL ONCOLOGY FOLLOW UP CONSULTATION NOTE    Patient ID: Daysi Solorzano is a 84 y.o. female.    Chief Complaint: B cell Lymphoma    HPI:  Patient is 82-year-old female with past medical history of COPD, emphysema, bronchitis, depression, gastroesophageal reflux disease, hypertension, congestive heart failure with ejection fraction normalized after biventricular pacing, atelectasis and scarring, degenerative joint disease, hyperlipidemia, sleep apnea, dilated cardiomyopathy, ventricular tachycardia status post ICD, coronary artery disease, chronic back pain, radiculopathy who was referred by Pulmonary after imaging revealed a 1.9 cm left lower lobe nodular density by CT/PET in March of 2019 with SUV 2. Patient underwent repeat PET-CT and left lower lobe CT-guided needle biopsy secondary to enlarging left lower lobe lesion.  Findings were consistent with B-cell lymphoma.  Please see detailed pathology report below.  Patient presents to our clinic today for further evaluation.      Pathology: 06/08/22    Lung nodule, left lower lobe, biopsy:  Extranodal marginal zone B-cell lymphoma (see comment).    Comment:  Sections of the small biopsy show a population of small to medium, round atypical lymphocytes with coarse chromatin and occasional nucleoli, demonstrating moderate cytoplasm and monocyte toyed morphology.  Immunohistochemical stains are performed on block 1A with appropriate controls (in addition to being repeated with flow cytometry) in order to further evaluate the atypical lymphocytes.  The cells of interest demonstrated positive staining for CD 20, [place] 5 and BCL 2 while showing negative straining for CD3 and CD5 (highlights T-cells), BCL6 and CD10 (highlights rare diminutive germinal centers) and cyclin D1.  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    Interpretation:  Taken together these  results demonstrate the presence of clonal population of B lymphocytes.  The immunophenotype in this case in our corrected stick of chronic lymphocytic leukemia/small lymphocytic lymphoma or mantle cell lymphoma.        Imaging:     PET/CT scan: 6/1/22    Chest:   1. There is an area of ground-glass infiltration seen in the left posterior lung.  This measures maximum of approximately 14 mm today versus approximately 8 mm on the previous examination.  The SUV in this area is approximately 1.5.  Given the growth of this lesion this would be suspicious for a possible low-grade malignancy.  2. An ill-defined area of ground-glass infiltrate is seen in the right mid lung field measuring approximately 1.3 cm.  This was not present on the previous examination.  This has an SUV of 1.7  3. A small area of ground-glass infiltrate is seen in the right lower lobe region measuring about 1 cm in diameter this has an SUV in the area of approximately 1.1  4. An area of spiculated parenchymal densities seen in the left lower lobe having a maximum diameter of approximately 2 cm.  This had a diameter of about 1.9 cm on previous examination.  The SUV in this is approximately 3.    Impression:  Multiple areas of parenchymal density in the lungs.  These are either slightly enlarged are new when compared to previous examinations.  Uptake values would be consistent with inflammatory, infectious or low-grade malignancy.      Past Medical History:   Diagnosis Date    Arthritis     COPD (chronic obstructive pulmonary disease)     Hypertension      Family History   Problem Relation Name Age of Onset    Lung cancer Father      Brain cancer Brother      Breast cancer Paternal Grandmother      Cancer Son lymphoma      Social History     Socioeconomic History    Marital status:    Tobacco Use    Smoking status: Former     Types: Cigarettes    Smokeless tobacco: Never   Substance and Sexual Activity    Alcohol use: Not Currently    Drug use:  Never     Past Surgical History:   Procedure Laterality Date    arm surgery      BACK SURGERY      CHOLECYSTECTOMY      TUBAL LIGATION           Review of systems:  Review of Systems   Constitutional:  Positive for activity change. Negative for appetite change, chills, diaphoresis, fatigue and unexpected weight change.   HENT:  Negative for congestion, facial swelling, hearing loss, mouth sores, trouble swallowing and voice change.    Eyes:  Negative for photophobia, pain, discharge and itching.   Respiratory:  Positive for cough. Negative for apnea, choking, chest tightness and shortness of breath.    Cardiovascular:  Negative for chest pain, palpitations and leg swelling.   Gastrointestinal:  Negative for abdominal distention, abdominal pain, anal bleeding and blood in stool.   Endocrine: Negative for cold intolerance, heat intolerance, polydipsia and polyphagia.   Genitourinary:  Negative for difficulty urinating, dysuria, flank pain and hematuria.   Musculoskeletal:  Negative for arthralgias, back pain, joint swelling, myalgias, neck pain and neck stiffness.   Skin:  Negative for color change, pallor and wound.   Allergic/Immunologic: Positive for environmental allergies. Negative for food allergies and immunocompromised state.   Neurological:  Negative for dizziness, seizures, facial asymmetry, speech difficulty, light-headedness, numbness and headaches.   Hematological:  Negative for adenopathy. Does not bruise/bleed easily.   Psychiatric/Behavioral:  Negative for agitation, behavioral problems, confusion, decreased concentration and sleep disturbance.          Physical Exam  Vitals and nursing note reviewed.   Constitutional:       General: She is not in acute distress.     Appearance: Normal appearance. She is not ill-appearing.   HENT:      Head: Normocephalic and atraumatic.      Nose: No congestion or rhinorrhea.   Eyes:      General: No scleral icterus.     Extraocular Movements: Extraocular movements  intact.      Pupils: Pupils are equal, round, and reactive to light.   Cardiovascular:      Rate and Rhythm: Normal rate and regular rhythm.      Pulses: Normal pulses.      Heart sounds: Normal heart sounds. No murmur heard.     No gallop.   Pulmonary:      Effort: Pulmonary effort is normal. No respiratory distress.      Breath sounds: Normal breath sounds. No stridor. No wheezing or rhonchi.   Abdominal:      General: Bowel sounds are normal. There is no distension.      Palpations: There is no mass.      Tenderness: There is no abdominal tenderness. There is no guarding.   Musculoskeletal:         General: No swelling, tenderness, deformity or signs of injury. Normal range of motion.      Cervical back: Normal range of motion and neck supple. No rigidity. No muscular tenderness.      Right lower leg: No edema.      Left lower leg: No edema.   Skin:     General: Skin is warm.      Coloration: Skin is not jaundiced or pale.      Findings: No bruising or lesion.   Neurological:      General: No focal deficit present.      Mental Status: She is alert and oriented to person, place, and time.      Cranial Nerves: No cranial nerve deficit.      Sensory: No sensory deficit.      Motor: No weakness.      Gait: Gait normal.   Psychiatric:         Mood and Affect: Mood normal.         Behavior: Behavior normal.         Thought Content: Thought content normal.       There were no vitals filed for this visit.      There is no height or weight on file to calculate BSA.    Assessment/Plan:      ECOG 2    1. 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     == PET scan does not show any findings beyond the diaphragm and I believe with a lesions noted on both sides of the lung we are likely looking at extranodal marginal zone B-cell lymphoma arising from b/l  lungs.   ==  6/20/22:  Hep panel negative. TMB discussion in the interim and also discussion with Radiation Oncology, also taking into account her lung function.  Per Dr. Ren radiation oncology ISRT is not an option considering we are looking at multiple lesions on bilateral lungs.  Also due to her decreased lung function surgical resection is not a possibility.  Consents is made per TMB discussion was to start systemic treatment with Rituximab.  Will obtain port placement and prior authorization request for rituximab  == 6/27/22:  Patient and daughter here today to discuss upcoming chemotherapy, side effect profile, risk versus benefits, and expected outcomes have been discussed today. All questions and concerns answered and consents have been signed. Port placed while in patient, healing well. Plan is to begin treatment on Thursday, labs reviewed, Hepatitis panel negative. LDH WNL, mild anemia noted.   == 7/6/22:  Patient reports being exposed to COVID yesterday at her [facility] home.  She reports 3 COVID vaccines but is unsure when her last booster was.  Will delay treatment for 1 week while patient is under quarantine and encouraged patient to notify clinic if she becomes symptomatic.  Plan is to reschedule cycle 2 Rituxan to Wednesday July 13th, okay to use labs from today.  She return to clinic in 2 weeks prior to cycle 3.   == 7/20/22:  Treatment will be delayed today until next Monday, patient was seen by pulmonology Dr. Dixon yesterday and was placed on antibiotic and steroids due to upper respiratory infection.  Infusion has been notified and plan is to complete cycle 3 on July 25, 2022.  No repeat labs needed treatment plan has been signed.  She will return to clinic in 1 week with labs prior to cycle 4 of 4 Rituxan.  == 12/3/22: S/p 4 cycles of Rituximab. PET scan 08/22 showed stable disease with no evidence of progression.  Continue maintenance Rituximab for consolidation--- rituximab Q 3 monthly for 2  years  == 3/28/23: continues with maintenance rituxan. 3/2023 Recent pet shows stable disease. She reports recent URI approximately 2 weeks ago requiring abx and breathing treatments. Will check immunoglobulins.   == 6/27/23: Doing well, currently enrolled with [facility] by PCP, labs reviewed will proceed with rituxan as planned for today.   ==10/10/23: PET scan showed no evidence of lymphoma recurrence. Continue maintenance rituximab q 3 monthly for total 2 years  ==10/16/23: Patient to clinic to discuss request for mediport removal.  She is on maintenance Rituximab q 3 months for total of 2 years. She receives Rituximab Hycela which is given subq.  I discussed potential for progression/recurrence and if that were to happen she would need mediport for chemotherapy. Patient reports she is 83 years old and she would likely not want more aggressive treatment if that were the case and she is very adamant about wanting mediport removed.  I will send surgical evaluation for mediport removal.  == 1/24/23: Continue with Rituximab q 3 monthly for total 2 years. S/p port removal per patient request. After completion of 2 years of Rituximab plan would be for observation with repeat PET scan q 3-6 monthly.  == 6/11/24:  Patient presents to clinic after a gap of almost 1.5 years.  In the interim she reports she had a possible seizure/CVA and was found unresponsive on the floor about 6 months back.  She was brought to the hospital and had a prolonged hospitalization for which she was evaluated by various physicians including cardiologist and neurologist.  She wishes to establish care and to restart rituximab if needed.  I discussed with her that it has been 1.5 years and almost 1 year since her plan prophylactic/maintenance rituximab.  I would recommend repeat PET scan for further evaluation.  If any abnormalities are noted or signs for progression of disease then will consider a repeat biopsy for further evaluation.  ==  7/22/24:  PET scan for restaging showed no new areas of abnormal hypermetabolic change.  Focal area of thickening and hypermetabolic activity of the musculature deep to distal scapular body is stable and not likely related.  Will hence continue with observation only at this time and continue imaging surveillance      Plan:   PET scan for restaging in 4 months  Continue to follow with her other physicians including primary care, Cardiology, Neurology    Return to clinic in 4 month  for follow up. Restaging PET scan prior      Future Appointments   Date Time Provider Department Center   7/22/2024  2:00 PM Leighton Ring MD [facility]         Total time spent in counseling and discussion about further management options including relevant lab work, treatment,  prognosis, medications and intended side effects was more than 30 minutes. More than 50% of the time was spent on counseling and coordination of care.  I spent a total of 30 minutes on the day of the visit.This includes face to face time and non-face to face time preparing to see the patient (eg, review of tests), Obtaining and/or reviewing separately obtained history, Documenting clinical information in the electronic or other health record, Independently interpreting resultsand communicating results to the patient/family/caregiver, or Care coordination.

## 2024-11-20 ENCOUNTER — TELEPHONE (OUTPATIENT)
Dept: HEMATOLOGY/ONCOLOGY | Facility: CLINIC | Age: 84
End: 2024-11-20
Payer: MEDICARE

## 2024-11-20 NOTE — TELEPHONE ENCOUNTER
Attempted to call pt regarding appt and pet scan, no answer . Per Cristofer's documentation under pet scan referrals. Pt is in rehab.

## 2024-11-20 NOTE — TELEPHONE ENCOUNTER
----- Message from Daysi sent at 11/20/2024  1:28 PM CST -----  Regarding: Cancellation  Contact: patient  Per phone call with patient, she stated that she would need to cancel her appointment on 11/21/2024 because she just got out of the hospital and she will call back to reschedule and get her Pet Scan done as well.  Please return call at [phone] (home).    Thanks,  SJ

## 2024-12-10 ENCOUNTER — TELEPHONE (OUTPATIENT)
Dept: HEMATOLOGY/ONCOLOGY | Facility: CLINIC | Age: 84
End: 2024-12-10
Payer: MEDICARE

## 2024-12-10 NOTE — TELEPHONE ENCOUNTER
Refaxed orders and clinical notes to [facility] for scheduling. Spoke with the daughter she has not heard from [facility] since she last spoke to our office in November

## 2024-12-10 NOTE — TELEPHONE ENCOUNTER
----- Message from Mercedez sent at 12/10/2024  3:24 PM CST -----  Patient daughter is calling to reschedule PET scan. Please call her back at [phone].

## 2024-12-23 ENCOUNTER — TELEPHONE (OUTPATIENT)
Dept: HEMATOLOGY/ONCOLOGY | Facility: CLINIC | Age: 84
End: 2024-12-23
Payer: MEDICARE

## 2025-01-15 ENCOUNTER — OFFICE VISIT (OUTPATIENT)
Dept: HEMATOLOGY/ONCOLOGY | Facility: CLINIC | Age: 85
End: 2025-01-15
Payer: MEDICARE

## 2025-01-15 VITALS
OXYGEN SATURATION: 91 % | BODY MASS INDEX: 31.4 KG/M2 | DIASTOLIC BLOOD PRESSURE: 67 MMHG | HEIGHT: 62 IN | WEIGHT: 170.63 LBS | HEART RATE: 96 BPM | SYSTOLIC BLOOD PRESSURE: 135 MMHG | RESPIRATION RATE: 16 BRPM

## 2025-01-15 DIAGNOSIS — C88.40 EXTRANODAL MARGINAL ZONE B-CELL LYMPHOMA: Primary | ICD-10-CM

## 2025-01-15 PROCEDURE — G2211 COMPLEX E/M VISIT ADD ON: HCPCS | Mod: S$PBB,GW,, | Performed by: INTERNAL MEDICINE

## 2025-01-15 PROCEDURE — 99214 OFFICE O/P EST MOD 30 MIN: CPT | Mod: S$PBB,GW,, | Performed by: INTERNAL MEDICINE

## 2025-01-15 NOTE — PROGRESS NOTES
MEDICAL ONCOLOGY FOLLOW UP CONSULTATION NOTE    Patient ID: Daysi Solorzano is a 84 y.o. female.    Chief Complaint: B cell Lymphoma    HPI:  Patient is 82-year-old female with past medical history of COPD, emphysema, bronchitis, depression, gastroesophageal reflux disease, hypertension, congestive heart failure with ejection fraction normalized after biventricular pacing, atelectasis and scarring, degenerative joint disease, hyperlipidemia, sleep apnea, dilated cardiomyopathy, ventricular tachycardia status post ICD, coronary artery disease, chronic back pain, radiculopathy who was referred by Pulmonary after imaging revealed a 1.9 cm left lower lobe nodular density by CT/PET in March of 2019 with SUV 2. Patient underwent repeat PET-CT and left lower lobe CT-guided needle biopsy secondary to enlarging left lower lobe lesion.  Findings were consistent with B-cell lymphoma.  Please see detailed pathology report below.  Patient presents to our clinic today for further evaluation.      Pathology: 06/08/22    Lung nodule, left lower lobe, biopsy:  Extranodal marginal zone B-cell lymphoma (see comment).    Comment:  Sections of the small biopsy show a population of small to medium, round atypical lymphocytes with coarse chromatin and occasional nucleoli, demonstrating moderate cytoplasm and monocyte toyed morphology.  Immunohistochemical stains are performed on block 1A with appropriate controls (in addition to being repeated with flow cytometry) in order to further evaluate the atypical lymphocytes.  The cells of interest demonstrated positive staining for CD 20, [place] 5 and BCL 2 while showing negative straining for CD3 and CD5 (highlights T-cells), BCL6 and CD10 (highlights rare diminutive germinal centers) and cyclin D1.  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    Interpretation:  Taken together these  results demonstrate the presence of clonal population of B lymphocytes.  The immunophenotype in this case in our corrected stick of chronic lymphocytic leukemia/small lymphocytic lymphoma or mantle cell lymphoma.        Imaging:     PET/CT scan: 6/1/22    Chest:   1. There is an area of ground-glass infiltration seen in the left posterior lung.  This measures maximum of approximately 14 mm today versus approximately 8 mm on the previous examination.  The SUV in this area is approximately 1.5.  Given the growth of this lesion this would be suspicious for a possible low-grade malignancy.  2. An ill-defined area of ground-glass infiltrate is seen in the right mid lung field measuring approximately 1.3 cm.  This was not present on the previous examination.  This has an SUV of 1.7  3. A small area of ground-glass infiltrate is seen in the right lower lobe region measuring about 1 cm in diameter this has an SUV in the area of approximately 1.1  4. An area of spiculated parenchymal densities seen in the left lower lobe having a maximum diameter of approximately 2 cm.  This had a diameter of about 1.9 cm on previous examination.  The SUV in this is approximately 3.    Impression:  Multiple areas of parenchymal density in the lungs.  These are either slightly enlarged are new when compared to previous examinations.  Uptake values would be consistent with inflammatory, infectious or low-grade malignancy.      Past Medical History:   Diagnosis Date    Arthritis     COPD (chronic obstructive pulmonary disease)     Hypertension      Family History   Problem Relation Name Age of Onset    Lung cancer Father      Brain cancer Brother      Breast cancer Paternal Grandmother      Cancer Son lymphoma      Social History     Socioeconomic History    Marital status:    Tobacco Use    Smoking status: Former     Types: Cigarettes    Smokeless tobacco: Never   Substance and Sexual Activity    Alcohol use: Not Currently    Drug use:  Never     Past Surgical History:   Procedure Laterality Date    arm surgery      BACK SURGERY      CHOLECYSTECTOMY      TUBAL LIGATION           Review of systems:  Review of Systems   Constitutional:  Positive for activity change. Negative for appetite change, chills, diaphoresis, fatigue and unexpected weight change.   HENT:  Negative for congestion, facial swelling, hearing loss, mouth sores, trouble swallowing and voice change.    Eyes:  Negative for photophobia, pain, discharge and itching.   Respiratory:  Positive for cough. Negative for apnea, choking, chest tightness and shortness of breath.    Cardiovascular:  Negative for chest pain, palpitations and leg swelling.   Gastrointestinal:  Negative for abdominal distention, abdominal pain, anal bleeding and blood in stool.   Endocrine: Negative for cold intolerance, heat intolerance, polydipsia and polyphagia.   Genitourinary:  Negative for difficulty urinating, dysuria, flank pain and hematuria.   Musculoskeletal:  Negative for arthralgias, back pain, joint swelling, myalgias, neck pain and neck stiffness.   Skin:  Negative for color change, pallor and wound.   Allergic/Immunologic: Positive for environmental allergies. Negative for food allergies and immunocompromised state.   Neurological:  Negative for dizziness, seizures, facial asymmetry, speech difficulty, light-headedness, numbness and headaches.   Hematological:  Negative for adenopathy. Does not bruise/bleed easily.   Psychiatric/Behavioral:  Negative for agitation, behavioral problems, confusion, decreased concentration and sleep disturbance.          Physical Exam  Vitals and nursing note reviewed.   Constitutional:       General: She is not in acute distress.     Appearance: Normal appearance. She is not ill-appearing.   HENT:      Head: Normocephalic and atraumatic.      Nose: No congestion or rhinorrhea.   Eyes:      General: No scleral icterus.     Extraocular Movements: Extraocular movements  intact.      Pupils: Pupils are equal, round, and reactive to light.   Cardiovascular:      Rate and Rhythm: Normal rate and regular rhythm.      Pulses: Normal pulses.      Heart sounds: Normal heart sounds. No murmur heard.     No gallop.   Pulmonary:      Effort: Pulmonary effort is normal. No respiratory distress.      Breath sounds: Normal breath sounds. No stridor. No wheezing or rhonchi.   Abdominal:      General: Bowel sounds are normal. There is no distension.      Palpations: There is no mass.      Tenderness: There is no abdominal tenderness. There is no guarding.   Musculoskeletal:         General: No swelling, tenderness, deformity or signs of injury. Normal range of motion.      Cervical back: Normal range of motion and neck supple. No rigidity. No muscular tenderness.      Right lower leg: No edema.      Left lower leg: No edema.   Skin:     General: Skin is warm.      Coloration: Skin is not jaundiced or pale.      Findings: No bruising or lesion.   Neurological:      General: No focal deficit present.      Mental Status: She is alert and oriented to person, place, and time.      Cranial Nerves: No cranial nerve deficit.      Sensory: No sensory deficit.      Motor: No weakness.      Gait: Gait normal.   Psychiatric:         Mood and Affect: Mood normal.         Behavior: Behavior normal.         Thought Content: Thought content normal.       Vitals:    01/15/25 1326   BP: 135/67   Pulse: 96   Resp: 16         Body surface area is 1.84 meters squared.    Assessment/Plan:      ECOG 2    1. 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     == PET scan does not show any findings beyond the diaphragm and I believe with a lesions noted on both sides of the lung we are likely looking at extranodal marginal zone B-cell lymphoma arising from b/l  lungs.    == 6/20/22:  Hep panel negative. TMB discussion in the interim and also discussion with Radiation Oncology, also taking into account her lung function.  Per Dr. Ren radiation oncology ISRT is not an option considering we are looking at multiple lesions on bilateral lungs.  Also due to her decreased lung function surgical resection is not a possibility.  Consents is made per TMB discussion was to start systemic treatment with Rituximab.  Will obtain port placement and prior authorization request for rituximab  == 6/27/22:  Patient and daughter here today to discuss upcoming chemotherapy, side effect profile, risk versus benefits, and expected outcomes have been discussed today. All questions and concerns answered and consents have been signed. Port placed while in patient, healing well. Plan is to begin treatment on Thursday, labs reviewed, Hepatitis panel negative. LDH WNL, mild anemia noted.   == 7/6/22:  Patient reports being exposed to COVID yesterday at her long-term home.  She reports 3 COVID vaccines but is unsure when her last booster was.  Will delay treatment for 1 week while patient is under quarantine and encouraged patient to notify clinic if she becomes symptomatic.  Plan is to reschedule cycle 2 Rituxan to Wednesday July 13th, okay to use labs from today.  She return to clinic in 2 weeks prior to cycle 3.   == 7/20/22:  Treatment will be delayed today until next Monday, patient was seen by pulmonology Dr. Dixon yesterday and was placed on antibiotic and steroids due to upper respiratory infection.  Infusion has been notified and plan is to complete cycle 3 on July 25, 2022.  No repeat labs needed treatment plan has been signed.  She will return to clinic in 1 week with labs prior to cycle 4 of 4 Rituxan.  == 12/3/22: S/p 4 cycles of Rituximab. PET scan 08/22 showed stable disease with no evidence of progression.  Continue maintenance Rituximab for consolidation--- rituximab Q 3 monthly for 2  years  == 3/28/23: continues with maintenance rituxan. 3/2023 Recent pet shows stable disease. She reports recent URI approximately 2 weeks ago requiring abx and breathing treatments. Will check immunoglobulins.   == 6/27/23: Doing well, currently enrolled with [facility] by PCP, labs reviewed will proceed with rituxan as planned for today.   ==10/10/23: PET scan showed no evidence of lymphoma recurrence. Continue maintenance rituximab q 3 monthly for total 2 years  ==10/16/23: Patient to clinic to discuss request for mediport removal.  She is on maintenance Rituximab q 3 months for total of 2 years. She receives Rituximab Hycela which is given subq.  I discussed potential for progression/recurrence and if that were to happen she would need mediport for chemotherapy. Patient reports she is 83 years old and she would likely not want more aggressive treatment if that were the case and she is very adamant about wanting mediport removed.  I will send surgical evaluation for mediport removal.  == 1/24/23: Continue with Rituximab q 3 monthly for total 2 years. S/p port removal per patient request. After completion of 2 years of Rituximab plan would be for observation with repeat PET scan q 3-6 monthly.  == 6/11/24:  Patient presents to clinic after a gap of almost 1.5 years.  In the interim she reports she had a possible seizure/CVA and was found unresponsive on the floor about 6 months back.  She was brought to the hospital and had a prolonged hospitalization for which she was evaluated by various physicians including cardiologist and neurologist.  She wishes to establish care and to restart rituximab if needed.  I discussed with her that it has been 1.5 years and almost 1 year since her plan prophylactic/maintenance rituximab.  I would recommend repeat PET scan for further evaluation.  If any abnormalities are noted or signs for progression of disease then will consider a repeat biopsy for further evaluation.  ==  7/22/24:  PET scan for restaging showed no new areas of abnormal hypermetabolic change.  Focal area of thickening and hypermetabolic activity of the musculature deep to distal scapular body is stable and not likely related.  Will hence continue with observation only at this time and continue imaging surveillance  == 1/15/25: PET scan for restaging showed stable non hypermetabolic pulmonary nodules. Other findings as noted on prior PET scan. No evidence of hypermetabolic adenopathy. I will hence continue with observation only. PET scan for restaging in 6 months      Plan:   Continue to follow with her other physicians including primary care, Cardiology, Neurology    Return to clinic in 3 months  for follow up with labs: CBC with diff, B12 levels prior      Total time spent in counseling and discussion about further management options including relevant lab work, treatment,  prognosis, medications and intended side effects was more than 30 minutes. More than 50% of the time was spent on counseling and coordination of care.  I spent a total of 30 minutes on the day of the visit.This includes face to face time and non-face to face time preparing to see the patient (eg, review of tests), Obtaining and/or reviewing separately obtained history, Documenting clinical information in the electronic or other health record, Independently interpreting resultsand communicating results to the patient/family/caregiver, or Care coordination.

## 2025-05-15 ENCOUNTER — OFFICE VISIT (OUTPATIENT)
Dept: HEMATOLOGY/ONCOLOGY | Facility: CLINIC | Age: 85
End: 2025-05-15
Payer: MEDICARE

## 2025-05-15 VITALS
RESPIRATION RATE: 16 BRPM | SYSTOLIC BLOOD PRESSURE: 134 MMHG | HEART RATE: 79 BPM | BODY MASS INDEX: 30.39 KG/M2 | WEIGHT: 165.13 LBS | OXYGEN SATURATION: 93 % | DIASTOLIC BLOOD PRESSURE: 68 MMHG | HEIGHT: 62 IN

## 2025-05-15 DIAGNOSIS — C88.40 EXTRANODAL MARGINAL ZONE B-CELL LYMPHOMA: Primary | ICD-10-CM

## 2025-05-15 DIAGNOSIS — D51.9 ANEMIA DUE TO VITAMIN B12 DEFICIENCY, UNSPECIFIED B12 DEFICIENCY TYPE: ICD-10-CM

## 2025-05-15 NOTE — PROGRESS NOTES
MEDICAL ONCOLOGY FOLLOW UP CONSULTATION NOTE    Patient ID: Daysi Solorzano is a 85 y.o. female.    Chief Complaint: B cell Lymphoma    HPI:  Patient is 82-year-old female with past medical history of COPD, emphysema, bronchitis, depression, gastroesophageal reflux disease, hypertension, congestive heart failure with ejection fraction normalized after biventricular pacing, atelectasis and scarring, degenerative joint disease, hyperlipidemia, sleep apnea, dilated cardiomyopathy, ventricular tachycardia status post ICD, coronary artery disease, chronic back pain, radiculopathy who was referred by Pulmonary after imaging revealed a 1.9 cm left lower lobe nodular density by CT/PET in March of 2019 with SUV 2. Patient underwent repeat PET-CT and left lower lobe CT-guided needle biopsy secondary to enlarging left lower lobe lesion.  Findings were consistent with B-cell lymphoma.  Please see detailed pathology report below.  Patient presents to our clinic today for further evaluation.      Pathology: 06/08/22    Lung nodule, left lower lobe, biopsy:  Extranodal marginal zone B-cell lymphoma (see comment).    Comment:  Sections of the small biopsy show a population of small to medium, round atypical lymphocytes with coarse chromatin and occasional nucleoli, demonstrating moderate cytoplasm and monocyte toyed morphology.  Immunohistochemical stains are performed on block 1A with appropriate controls (in addition to being repeated with flow cytometry) in order to further evaluate the atypical lymphocytes.  The cells of interest demonstrated positive staining for CD 20, [place] 5 and BCL 2 while showing negative straining for CD3 and CD5 (highlights T-cells), BCL6 and CD10 (highlights rare diminutive germinal centers) and cyclin D1.  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    Interpretation:  Taken together these  results demonstrate the presence of clonal population of B lymphocytes.  The immunophenotype in this case in our corrected stick of chronic lymphocytic leukemia/small lymphocytic lymphoma or mantle cell lymphoma.        Imaging:     PET/CT scan: 6/1/22    Chest:   1. There is an area of ground-glass infiltration seen in the left posterior lung.  This measures maximum of approximately 14 mm today versus approximately 8 mm on the previous examination.  The SUV in this area is approximately 1.5.  Given the growth of this lesion this would be suspicious for a possible low-grade malignancy.  2. An ill-defined area of ground-glass infiltrate is seen in the right mid lung field measuring approximately 1.3 cm.  This was not present on the previous examination.  This has an SUV of 1.7  3. A small area of ground-glass infiltrate is seen in the right lower lobe region measuring about 1 cm in diameter this has an SUV in the area of approximately 1.1  4. An area of spiculated parenchymal densities seen in the left lower lobe having a maximum diameter of approximately 2 cm.  This had a diameter of about 1.9 cm on previous examination.  The SUV in this is approximately 3.    Impression:  Multiple areas of parenchymal density in the lungs.  These are either slightly enlarged are new when compared to previous examinations.  Uptake values would be consistent with inflammatory, infectious or low-grade malignancy.      Past Medical History:   Diagnosis Date    Arthritis     COPD (chronic obstructive pulmonary disease)     Hypertension      Family History   Problem Relation Name Age of Onset    Lung cancer Father      Brain cancer Brother      Breast cancer Paternal Grandmother      Cancer Son lymphoma      Social History     Socioeconomic History    Marital status:    Tobacco Use    Smoking status: Former     Types: Cigarettes    Smokeless tobacco: Never   Substance and Sexual Activity    Alcohol use: Not Currently    Drug use:  Never     Past Surgical History:   Procedure Laterality Date    arm surgery      BACK SURGERY      CHOLECYSTECTOMY      TUBAL LIGATION           Review of systems:  Review of Systems   Constitutional:  Positive for activity change. Negative for appetite change, chills, diaphoresis, fatigue and unexpected weight change.   HENT:  Negative for congestion, facial swelling, hearing loss, mouth sores, trouble swallowing and voice change.    Eyes:  Negative for photophobia, pain, discharge and itching.   Respiratory:  Positive for cough. Negative for apnea, choking, chest tightness and shortness of breath.    Cardiovascular:  Negative for chest pain, palpitations and leg swelling.   Gastrointestinal:  Negative for abdominal distention, abdominal pain, anal bleeding and blood in stool.   Endocrine: Negative for cold intolerance, heat intolerance, polydipsia and polyphagia.   Genitourinary:  Negative for difficulty urinating, dysuria, flank pain and hematuria.   Musculoskeletal:  Negative for arthralgias, back pain, joint swelling, myalgias, neck pain and neck stiffness.   Skin:  Negative for color change, pallor and wound.   Allergic/Immunologic: Positive for environmental allergies. Negative for food allergies and immunocompromised state.   Neurological:  Negative for dizziness, seizures, facial asymmetry, speech difficulty, light-headedness, numbness and headaches.   Hematological:  Negative for adenopathy. Does not bruise/bleed easily.   Psychiatric/Behavioral:  Negative for agitation, behavioral problems, confusion, decreased concentration and sleep disturbance.          Physical Exam  Vitals and nursing note reviewed.   Constitutional:       General: She is not in acute distress.     Appearance: Normal appearance. She is not ill-appearing.   HENT:      Head: Normocephalic and atraumatic.      Nose: No congestion or rhinorrhea.   Eyes:      General: No scleral icterus.     Extraocular Movements: Extraocular movements  intact.      Pupils: Pupils are equal, round, and reactive to light.   Cardiovascular:      Rate and Rhythm: Normal rate and regular rhythm.      Pulses: Normal pulses.      Heart sounds: Normal heart sounds. No murmur heard.     No gallop.   Pulmonary:      Effort: Pulmonary effort is normal. No respiratory distress.      Breath sounds: Normal breath sounds. No stridor. No wheezing or rhonchi.   Abdominal:      General: Bowel sounds are normal. There is no distension.      Palpations: There is no mass.      Tenderness: There is no abdominal tenderness. There is no guarding.   Musculoskeletal:         General: No swelling, tenderness, deformity or signs of injury. Normal range of motion.      Cervical back: Normal range of motion and neck supple. No rigidity. No muscular tenderness.      Right lower leg: No edema.      Left lower leg: No edema.   Skin:     General: Skin is warm.      Coloration: Skin is not jaundiced or pale.      Findings: No bruising or lesion.   Neurological:      General: No focal deficit present.      Mental Status: She is alert and oriented to person, place, and time.      Cranial Nerves: No cranial nerve deficit.      Sensory: No sensory deficit.      Motor: No weakness.      Gait: Gait normal.   Psychiatric:         Mood and Affect: Mood normal.         Behavior: Behavior normal.         Thought Content: Thought content normal.       Vitals:    05/15/25 1331   BP: 134/68   Pulse: 79   Resp: 16         Body surface area is 1.81 meters squared.    Assessment/Plan:      ECOG 2    1. 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     == PET scan does not show any findings beyond the diaphragm and I believe with a lesions noted on both sides of the lung we are likely looking at extranodal marginal zone B-cell lymphoma arising from b/l  lungs.    == 6/20/22:  Hep panel negative. TMB discussion in the interim and also discussion with Radiation Oncology, also taking into account her lung function.  Per Dr. Ren radiation oncology ISRT is not an option considering we are looking at multiple lesions on bilateral lungs.  Also due to her decreased lung function surgical resection is not a possibility.  Consents is made per TMB discussion was to start systemic treatment with Rituximab.  Will obtain port placement and prior authorization request for rituximab  == 6/27/22:  Patient and daughter here today to discuss upcoming chemotherapy, side effect profile, risk versus benefits, and expected outcomes have been discussed today. All questions and concerns answered and consents have been signed. Port placed while in patient, healing well. Plan is to begin treatment on Thursday, labs reviewed, Hepatitis panel negative. LDH WNL, mild anemia noted.   == 7/6/22:  Patient reports being exposed to COVID yesterday at her [facility] home.  She reports 3 COVID vaccines but is unsure when her last booster was.  Will delay treatment for 1 week while patient is under quarantine and encouraged patient to notify clinic if she becomes symptomatic.  Plan is to reschedule cycle 2 Rituxan to Wednesday July 13th, okay to use labs from today.  She return to clinic in 2 weeks prior to cycle 3.   == 7/20/22:  Treatment will be delayed today until next Monday, patient was seen by pulmonology Dr. Dixon yesterday and was placed on antibiotic and steroids due to upper respiratory infection.  Infusion has been notified and plan is to complete cycle 3 on July 25, 2022.  No repeat labs needed treatment plan has been signed.  She will return to clinic in 1 week with labs prior to cycle 4 of 4 Rituxan.  == 12/3/22: S/p 4 cycles of Rituximab. PET scan 08/22 showed stable disease with no evidence of progression.  Continue maintenance Rituximab for consolidation--- rituximab Q 3 monthly for 2  years  == 3/28/23: continues with maintenance rituxan. 3/2023 Recent pet shows stable disease. She reports recent URI approximately 2 weeks ago requiring abx and breathing treatments. Will check immunoglobulins.   == 6/27/23: Doing well, currently enrolled with [facility] by PCP, labs reviewed will proceed with rituxan as planned for today.   ==10/10/23: PET scan showed no evidence of lymphoma recurrence. Continue maintenance rituximab q 3 monthly for total 2 years  ==10/16/23: Patient to clinic to discuss request for mediport removal.  She is on maintenance Rituximab q 3 months for total of 2 years. She receives Rituximab Hycela which is given subq.  I discussed potential for progression/recurrence and if that were to happen she would need mediport for chemotherapy. Patient reports she is 83 years old and she would likely not want more aggressive treatment if that were the case and she is very adamant about wanting mediport removed.  I will send surgical evaluation for mediport removal.  == 1/24/23: Continue with Rituximab q 3 monthly for total 2 years. S/p port removal per patient request. After completion of 2 years of Rituximab plan would be for observation with repeat PET scan q 3-6 monthly.  == 6/11/24:  Patient presents to clinic after a gap of almost 1.5 years.  In the interim she reports she had a possible seizure/CVA and was found unresponsive on the floor about 6 months back.  She was brought to the hospital and had a prolonged hospitalization for which she was evaluated by various physicians including cardiologist and neurologist.  She wishes to establish care and to restart rituximab if needed.  I discussed with her that it has been 1.5 years and almost 1 year since her plan prophylactic/maintenance rituximab.  I would recommend repeat PET scan for further evaluation.  If any abnormalities are noted or signs for progression of disease then will consider a repeat biopsy for further evaluation.  ==  7/22/24:  PET scan for restaging showed no new areas of abnormal hypermetabolic change.  Focal area of thickening and hypermetabolic activity of the musculature deep to distal scapular body is stable and not likely related.  Will hence continue with observation only at this time and continue imaging surveillance  == 1/15/25: PET scan for restaging showed stable non hypermetabolic pulmonary nodules. Other findings as noted on prior PET scan. No evidence of hypermetabolic adenopathy. I will hence continue with observation only. PET scan for restaging in 6 months  == 5/15/25: doing well with no c/o. Labs reviewed. Stable anemia noted, B12 low normal. Denies any B symptoms. Will order PET/CT       Plan:   Continue to follow with her other physicians including primary care, Cardiology, Neurology    Return to clinic in 6 weeks for follow up with PET scan       Total time spent in counseling and discussion about further management options including relevant lab work, treatment,  prognosis, medications and intended side effects was more than 30 minutes. More than 50% of the time was spent on counseling and coordination of care.  I spent a total of 30 minutes on the day of the visit.This includes face to face time and non-face to face time preparing to see the patient (eg, review of tests), Obtaining and/or reviewing separately obtained history, Documenting clinical information in the electronic or other health record, Independently interpreting resultsand communicating results to the patient/family/caregiver, or Care coordination.

## 2025-07-08 ENCOUNTER — DOCUMENTATION ONLY (OUTPATIENT)
Dept: HEMATOLOGY/ONCOLOGY | Facility: CLINIC | Age: 85
End: 2025-07-08
Payer: MEDICARE

## 2025-07-17 ENCOUNTER — TELEPHONE (OUTPATIENT)
Dept: HEMATOLOGY/ONCOLOGY | Facility: CLINIC | Age: 85
End: 2025-07-17
Payer: MEDICARE

## 2025-07-17 NOTE — TELEPHONE ENCOUNTER
Copied from CRM #[id number]. Topic: General Inquiry - Patient Advice  >> Jul 16, 2025  3:02 PM Mercedez wrote:  Patient calling to to reschedule appointments once she is out of hospital